# Patient Record
Sex: FEMALE | Race: WHITE | NOT HISPANIC OR LATINO | Employment: UNEMPLOYED | URBAN - METROPOLITAN AREA
[De-identification: names, ages, dates, MRNs, and addresses within clinical notes are randomized per-mention and may not be internally consistent; named-entity substitution may affect disease eponyms.]

---

## 2018-07-26 ENCOUNTER — TELEPHONE (OUTPATIENT)
Dept: OBGYN CLINIC | Facility: CLINIC | Age: 28
End: 2018-07-26

## 2018-07-26 ENCOUNTER — OFFICE VISIT (OUTPATIENT)
Dept: OBGYN CLINIC | Facility: MEDICAL CENTER | Age: 28
End: 2018-07-26
Payer: COMMERCIAL

## 2018-07-26 VITALS
BODY MASS INDEX: 30.37 KG/M2 | WEIGHT: 189 LBS | SYSTOLIC BLOOD PRESSURE: 102 MMHG | DIASTOLIC BLOOD PRESSURE: 70 MMHG | HEIGHT: 66 IN

## 2018-07-26 DIAGNOSIS — Z34.91 PRENATAL CARE IN FIRST TRIMESTER: Primary | ICD-10-CM

## 2018-07-26 DIAGNOSIS — N91.2 AMENORRHEA: Primary | ICD-10-CM

## 2018-07-26 PROBLEM — Z87.59 STATUS POST VACUUM-ASSISTED VAGINAL DELIVERY: Status: ACTIVE | Noted: 2017-07-09

## 2018-07-26 PROBLEM — E03.9 HYPOTHYROIDISM AFFECTING PREGNANCY: Status: ACTIVE | Noted: 2017-07-08

## 2018-07-26 PROBLEM — E03.9 HYPOTHYROIDISM AFFECTING PREGNANCY: Status: RESOLVED | Noted: 2017-07-08 | Resolved: 2018-07-26

## 2018-07-26 PROBLEM — E89.0 HYPOTHYROIDISM, POSTSURGICAL: Status: ACTIVE | Noted: 2018-07-26

## 2018-07-26 PROBLEM — O99.280 HYPOTHYROIDISM AFFECTING PREGNANCY: Status: RESOLVED | Noted: 2017-07-08 | Resolved: 2018-07-26

## 2018-07-26 PROBLEM — O99.280 HYPOTHYROIDISM AFFECTING PREGNANCY: Status: ACTIVE | Noted: 2017-07-08

## 2018-07-26 PROBLEM — Z87.59 STATUS POST VACUUM-ASSISTED VAGINAL DELIVERY: Status: RESOLVED | Noted: 2017-07-09 | Resolved: 2018-07-26

## 2018-07-26 PROCEDURE — 99202 OFFICE O/P NEW SF 15 MIN: CPT | Performed by: NURSE PRACTITIONER

## 2018-07-26 PROCEDURE — 76817 TRANSVAGINAL US OBSTETRIC: CPT | Performed by: NURSE PRACTITIONER

## 2018-07-26 RX ORDER — LEVOTHYROXINE SODIUM 175 UG/1
TABLET ORAL
Refills: 3 | COMMUNITY
Start: 2018-07-06 | End: 2018-08-28

## 2018-07-26 RX ORDER — LEVOTHYROXINE SODIUM 150 UG/1
CAPSULE ORAL
COMMUNITY
Start: 2017-02-14 | End: 2018-08-28

## 2018-07-26 NOTE — PROGRESS NOTES
EARLY PREGNANCY ULTRASOUND    SUBJECTIVE    HPI: Kezia Go is a 33 y o   new patient female here today for early pregnancy ultrasound  Accompanied by FOB  No LMP recorded (lmp unknown)  Patient is pregnant  Menses are irregular and come every 1 5 months or so  This pregnancy was unplanned but not prevented  PMHx is significant for thyroid cancer with resulting thyroidectomy and now on replacement therapy - TSH levels are managed by PCP, she cannot recall name of this provider at this time  OBHx is significant for vacuum-assisted vaginal delivery 2017 at a hospital in [place]  Denies a family history of birth defects or intellectual defects  Did have varicella as a child  Not Taking a prenatal vitamin  Works as a SAHM  Allergies   Allergen Reactions    Aspirin Anaphylaxis and Angioedema       Current Outpatient Prescriptions:     levothyroxine 75 mcg tablet, TK 1 T PO D, Disp: , Rfl:     levothyroxine 175 mcg tablet, , Disp: , Rfl: 3      OBJECTIVE  Vitals:    18 1111   BP: 102/70   BP Location: Left arm   Patient Position: Sitting   Weight: 85 7 kg (189 lb)   Height: 5' 5 5" (1 664 m)         Early OB Ultrasound Procedure Note: Transvaginal US    Technician: Study performed by the interpreting NP    Indications:  Early gestation, dating & viability    Procedure Details: Limited ultrasound examination  The entire study was done at settings of 6 0 to 8 0 MHz  Findings:  A gestational sac is Present  A yolk sac is Present  The crown-rump length measures 0 75 centimeters and calculates to an estimated gestational age of 7 weeks, 5 days  Embryonic cardiac activity is seen at a rate of 143 b/min  The cul-de-sac has no fluid  The uterus is anteverted in position  There is a corpus luteum on the left ovary   The right ovary appears normal  The cervix appears normal       ASSESSMENT  Viable, aviles intrauterine pregnancy  6 weeks 5 days with a calculated ELIEZER of 3/16/2019 based on CRL  Hypothyroidism      PLAN  1 - Discussed referral to M to review genetic screening options for fetus  2 - Return to office for OB interview and PN-1 visit  3 - Begin taking a prenatal vitamin  4 - Plan to draw TSH levels with routine prenatal labs  All questions were answered & Niraj Gaona expressed understanding      Salas Apo

## 2018-07-26 NOTE — Clinical Note
Please find out name of patient's pharmacy so that I can call in a prenatal vitamin prescription for her  Thanks!

## 2018-07-26 NOTE — TELEPHONE ENCOUNTER
----- Message from [facility] sent at 7/26/2018  5:19 PM EDT -----  Please find out name of patient's pharmacy so that I can call in a prenatal vitamin prescription for her  Thanks!

## 2018-07-27 RX ORDER — PNV NO.95/FERROUS FUM/FOLIC AC 28MG-0.8MG
1 TABLET ORAL DAILY
Qty: 90 TABLET | Refills: 3 | Status: SHIPPED | OUTPATIENT
Start: 2018-07-27 | End: 2021-05-12 | Stop reason: ALTCHOICE

## 2018-08-09 ENCOUNTER — INITIAL PRENATAL (OUTPATIENT)
Dept: OBGYN CLINIC | Facility: CLINIC | Age: 28
End: 2018-08-09

## 2018-08-09 VITALS
WEIGHT: 192.6 LBS | SYSTOLIC BLOOD PRESSURE: 108 MMHG | BODY MASS INDEX: 30.95 KG/M2 | HEIGHT: 66 IN | DIASTOLIC BLOOD PRESSURE: 66 MMHG

## 2018-08-09 DIAGNOSIS — Z34.81 PRENATAL CARE, SUBSEQUENT PREGNANCY, FIRST TRIMESTER: Primary | ICD-10-CM

## 2018-08-09 PROCEDURE — OBC: Performed by: OBSTETRICS & GYNECOLOGY

## 2018-08-09 NOTE — PROGRESS NOTES
Pn Int completed  Pt & FOB oriented to office/outpt labs  Pn bldwk ordered in epic, including TSH/FT4  UnPlanned pregnancy, but are happy  Consults for seq screen & level 2  20 wks u s  Entered in [facility]  Pn 1 visit scheduled  Pt & FOB to consider CF testing-informed NPO status 1 hr prior

## 2018-08-15 ENCOUNTER — APPOINTMENT (OUTPATIENT)
Dept: LAB | Facility: CLINIC | Age: 28
End: 2018-08-15
Payer: COMMERCIAL

## 2018-08-15 ENCOUNTER — TELEPHONE (OUTPATIENT)
Dept: OBGYN CLINIC | Facility: CLINIC | Age: 28
End: 2018-08-15

## 2018-08-15 ENCOUNTER — TRANSCRIBE ORDERS (OUTPATIENT)
Dept: LAB | Facility: CLINIC | Age: 28
End: 2018-08-15

## 2018-08-15 DIAGNOSIS — Z34.81 PRENATAL CARE, SUBSEQUENT PREGNANCY, FIRST TRIMESTER: Primary | ICD-10-CM

## 2018-08-15 DIAGNOSIS — R79.89 ELEVATED TSH: Primary | ICD-10-CM

## 2018-08-15 LAB
ABO GROUP BLD: NORMAL
BASOPHILS # BLD AUTO: 0.04 THOUSANDS/ΜL (ref 0–0.1)
BASOPHILS NFR BLD AUTO: 0 % (ref 0–1)
BLD GP AB SCN SERPL QL: NEGATIVE
EOSINOPHIL # BLD AUTO: 0.06 THOUSAND/ΜL (ref 0–0.61)
EOSINOPHIL NFR BLD AUTO: 1 % (ref 0–6)
ERYTHROCYTE [DISTWIDTH] IN BLOOD BY AUTOMATED COUNT: 14.7 % (ref 11.6–15.1)
HCT VFR BLD AUTO: 37.2 % (ref 34.8–46.1)
HGB BLD-MCNC: 11.9 G/DL (ref 11.5–15.4)
IMM GRANULOCYTES # BLD AUTO: 0.03 THOUSAND/UL (ref 0–0.2)
IMM GRANULOCYTES NFR BLD AUTO: 0 % (ref 0–2)
LYMPHOCYTES # BLD AUTO: 1.74 THOUSANDS/ΜL (ref 0.6–4.47)
LYMPHOCYTES NFR BLD AUTO: 19 % (ref 14–44)
MCH RBC QN AUTO: 27.1 PG (ref 26.8–34.3)
MCHC RBC AUTO-ENTMCNC: 32 G/DL (ref 31.4–37.4)
MCV RBC AUTO: 85 FL (ref 82–98)
MONOCYTES # BLD AUTO: 0.41 THOUSAND/ΜL (ref 0.17–1.22)
MONOCYTES NFR BLD AUTO: 5 % (ref 4–12)
NEUTROPHILS # BLD AUTO: 6.72 THOUSANDS/ΜL (ref 1.85–7.62)
NEUTS SEG NFR BLD AUTO: 75 % (ref 43–75)
NRBC BLD AUTO-RTO: 0 /100 WBCS
PLATELET # BLD AUTO: 317 THOUSANDS/UL (ref 149–390)
PMV BLD AUTO: 9.9 FL (ref 8.9–12.7)
RBC # BLD AUTO: 4.39 MILLION/UL (ref 3.81–5.12)
RH BLD: POSITIVE
RUBV IGG SERPL IA-ACNC: 69.9 IU/ML
SPECIMEN EXPIRATION DATE: NORMAL
T4 FREE SERPL-MCNC: 0.94 NG/DL (ref 0.76–1.46)
TSH SERPL DL<=0.05 MIU/L-ACNC: 29.1 UIU/ML (ref 0.36–3.74)
WBC # BLD AUTO: 9 THOUSAND/UL (ref 4.31–10.16)

## 2018-08-15 PROCEDURE — 36415 COLL VENOUS BLD VENIPUNCTURE: CPT

## 2018-08-15 PROCEDURE — 84443 ASSAY THYROID STIM HORMONE: CPT

## 2018-08-15 PROCEDURE — 84439 ASSAY OF FREE THYROXINE: CPT

## 2018-08-15 PROCEDURE — 80081 OBSTETRIC PANEL INC HIV TSTG: CPT

## 2018-08-15 NOTE — TELEPHONE ENCOUNTER
----- Message from Sally Good MD sent at 8/15/2018  3:59 PM EDT -----  Can you please call Higinio Jadyn - her TSH is VERY elevated for pregnancy  Can you confirm what dosing she is on, and that she has been compliant with therapy because we are going to need to increase her dose

## 2018-08-15 NOTE — TELEPHONE ENCOUNTER
CARLOS    Spoke with Pt today via phone call  Pt states that she takes 150 mcg and 175 mcg of Levothyroxine daily  Pt further states she alternates taking the two different dosages each day, takes 150 mcg one day, takes 175 mcg the next day per doctor's order  Pt states she has been compliant with taking her Levothyroxine medication daily  Pt informed that her TSH level is very elevated for pregnancy, thyroid medication may need to be increased per doctor's recommendation

## 2018-08-16 ENCOUNTER — APPOINTMENT (OUTPATIENT)
Dept: LAB | Facility: CLINIC | Age: 28
End: 2018-08-16
Payer: COMMERCIAL

## 2018-08-16 LAB
BACTERIA UR QL AUTO: ABNORMAL /HPF
BILIRUB UR QL STRIP: NEGATIVE
CLARITY UR: ABNORMAL
COLOR UR: YELLOW
GLUCOSE UR STRIP-MCNC: NEGATIVE MG/DL
HBV SURFACE AG SER QL: NORMAL
HGB UR QL STRIP.AUTO: NEGATIVE
KETONES UR STRIP-MCNC: NEGATIVE MG/DL
LEUKOCYTE ESTERASE UR QL STRIP: ABNORMAL
NITRITE UR QL STRIP: NEGATIVE
NON-SQ EPI CELLS URNS QL MICRO: ABNORMAL /HPF
PH UR STRIP.AUTO: 7 [PH] (ref 4.5–8)
PROT UR STRIP-MCNC: NEGATIVE MG/DL
RBC #/AREA URNS AUTO: ABNORMAL /HPF
RPR SER QL: NORMAL
SP GR UR STRIP.AUTO: 1.02 (ref 1–1.03)
UROBILINOGEN UR QL STRIP.AUTO: 0.2 E.U./DL
WBC #/AREA URNS AUTO: ABNORMAL /HPF

## 2018-08-16 PROCEDURE — 81001 URINALYSIS AUTO W/SCOPE: CPT

## 2018-08-16 PROCEDURE — 87086 URINE CULTURE/COLONY COUNT: CPT

## 2018-08-18 LAB
BACTERIA UR CULT: NORMAL
HIV 1+2 AB+HIV1 P24 AG SERPL QL IA: NORMAL

## 2018-08-20 NOTE — TELEPHONE ENCOUNTER
Spoke with pt - she stated she spoke with her Endocrine and they advised her to take 200mcg every day and recheck in 4 wks  Lab mailed to pt

## 2018-08-28 ENCOUNTER — ROUTINE PRENATAL (OUTPATIENT)
Dept: OBGYN CLINIC | Age: 28
End: 2018-08-28

## 2018-08-28 VITALS — WEIGHT: 191 LBS | DIASTOLIC BLOOD PRESSURE: 62 MMHG | SYSTOLIC BLOOD PRESSURE: 120 MMHG | BODY MASS INDEX: 30.83 KG/M2

## 2018-08-28 DIAGNOSIS — R79.89 ELEVATED TSH: ICD-10-CM

## 2018-08-28 DIAGNOSIS — Z34.81 ENCOUNTER FOR SUPERVISION OF OTHER NORMAL PREGNANCY IN FIRST TRIMESTER: Primary | ICD-10-CM

## 2018-08-28 PROCEDURE — G0145 SCR C/V CYTO,THINLAYER,RESCR: HCPCS | Performed by: NURSE PRACTITIONER

## 2018-08-28 PROCEDURE — PNV: Performed by: NURSE PRACTITIONER

## 2018-08-28 PROCEDURE — 87591 N.GONORRHOEAE DNA AMP PROB: CPT | Performed by: NURSE PRACTITIONER

## 2018-08-28 PROCEDURE — 87491 CHLMYD TRACH DNA AMP PROBE: CPT | Performed by: NURSE PRACTITIONER

## 2018-08-28 RX ORDER — LEVOTHYROXINE SODIUM 0.2 MG/1
TABLET ORAL
Refills: 1 | COMMUNITY
Start: 2018-08-17 | End: 2019-05-14 | Stop reason: DRUGHIGH

## 2018-08-28 NOTE — PATIENT INSTRUCTIONS
Pregnancy at 11 to 14 Weeks   AMBULATORY CARE:   What changes are happening to your body: You are now at the end of your first trimester and entering your second trimester  Morning sickness usually goes away by this time  You may have other symptoms such as fatigue, frequent urination, and headaches  You may have gained between 2 to 4 pounds by now  Seek care immediately if:   · You have pain or cramping in your abdomen or low back  · You have heavy vaginal bleeding or clotting  · You pass material that looks like tissue or large clots  Collect the material and bring it with you  Contact your healthcare provider if:   · You cannot keep food or drinks down, and you are losing weight  · You have light bleeding  · You have chills or a fever  · You have vaginal itching, burning, or pain  · You have yellow, green, white, or foul-smelling vaginal discharge  · You have pain or burning when you urinate, less urine than usual, or pink or bloody urine  · You have questions or concerns about your condition or care  How to care for yourself at this stage of your pregnancy:   · Get plenty of rest   You may feel more tired than normal  You may need to take naps or go to bed earlier  · Manage nausea and vomiting  Avoid fatty and spicy foods  Eat small meals throughout the day instead of large meals  Ines may help to decrease nausea  Ask your healthcare provider about other ways of decreasing nausea and vomiting  · Eat a variety of healthy foods  Healthy foods include fruits, vegetables, whole-grain breads, low-fat dairy foods, beans, lean meats, and fish  Drink liquids as directed  Ask how much liquid to drink each day and which liquids are best for you  Limit caffeine to less than 200 milligrams each day  Limit your intake of fish to 2 servings each week  Choose fish low in mercury such as canned light tuna, shrimp, salmon, cod, or tilapia   Do not  eat fish high in mercury such as swordfish, tilefish, clayton mackerel, and shark  · Take prenatal vitamins as directed  Your need for certain vitamins and minerals, such as folic acid, increases during pregnancy  Prenatal vitamins provide some of the extra vitamins and minerals you need  Prenatal vitamins may also help to decrease the risk of certain birth defects  · Do not smoke  If you smoke, it is never too late to quit  Smoking increases your risk of a miscarriage and other health problems during your pregnancy  Smoking can cause your baby to be born too early or weigh less at birth  Ask your healthcare provider for information if you need help quitting  · Do not drink alcohol  Alcohol passes from your body to your baby through the placenta  It can affect your baby's brain development and cause fetal alcohol syndrome (FAS)  FAS is a group of conditions that causes mental, behavior, and growth problems  · Talk to your healthcare provider before you take any medicines  Many medicines may harm your baby if you take them when you are pregnant  Do not take any medicines, vitamins, herbs, or supplements without first talking to your healthcare provider  Never use illegal or street drugs (such as marijuana or cocaine) while you are pregnant  Safety tips during pregnancy:   · Avoid hot tubs and saunas  Do not use a hot tub or sauna while you are pregnant, especially during your first trimester  Hot tubs and saunas may raise your baby's temperature and increase the risk of birth defects  · Avoid toxoplasmosis  This is an infection caused by eating raw meat or being around infected cat feces  It can cause birth defects, miscarriages, and other problems  Wash your hands after you touch raw meat  Make sure any meat is well-cooked before you eat it  Avoid raw eggs and unpasteurized milk  Use gloves or ask someone else to clean your cat's litter box while you are pregnant  Changes that are happening with your baby:   Your baby has fully formed fingernails and toenails  Your baby's heartbeat can now be heard  Ask your healthcare provider if you can listen to your baby's heartbeat  By week 14, your baby is over 4 inches long from the top of the head to the rump (baby's bottom)  Your baby weighs over 3 ounces  What you need to know about prenatal care:  During the first 28 weeks of your pregnancy, you will see your healthcare provider once a month  Prenatal care can help prevent problems during pregnancy and childbirth  Your healthcare provider will check your blood pressure and weight  You may also need any of the following:  · A urine test  may also be done to check for sugar and protein  These can be signs of gestational diabetes or infection  · Genetic disorders screening tests  may be offered to you  This screening test checks your baby's risk of genetic disorders such as Down syndrome  The screening test includes a blood test and ultrasound  · Your baby's heart rate  will be checked  © 2017 [employer] Information is for End User's use only and may not be sold, redistributed or otherwise used for commercial purposes  All illustrations and images included in CareNotes® are the copyrighted property of [employer]  or Martín Francis  The above information is an  only  It is not intended as medical advice for individual conditions or treatments  Talk to your doctor, nurse or pharmacist before following any medical regimen to see if it is safe and effective for you

## 2018-08-28 NOTE — PROGRESS NOTES
Problem List Items Addressed This Visit     None      Visit Diagnoses     Encounter for supervision of other normal pregnancy in first trimester    -  Primary    Elevated TSH            Here for 1st OB appt with  and 3 yo son  Feels well  Pap/GC/CT done today  Changed her mind about CF testing and declined  Perinatology appt on 9/11  Denies LOF/CTX/VB  No concerns  Currently on levothyroxine 200mcg-seeing Endocrinology in 2 weeks

## 2018-08-29 LAB
CHLAMYDIA DNA CVX QL NAA+PROBE: NORMAL
N GONORRHOEA DNA GENITAL QL NAA+PROBE: NORMAL

## 2018-08-31 LAB
LAB AP GYN PRIMARY INTERPRETATION: NORMAL
Lab: NORMAL

## 2018-09-11 ENCOUNTER — ROUTINE PRENATAL (OUTPATIENT)
Dept: PERINATAL CARE | Facility: MEDICAL CENTER | Age: 28
End: 2018-09-11
Payer: COMMERCIAL

## 2018-09-11 VITALS
DIASTOLIC BLOOD PRESSURE: 69 MMHG | WEIGHT: 195 LBS | SYSTOLIC BLOOD PRESSURE: 113 MMHG | HEART RATE: 73 BPM | HEIGHT: 66 IN | BODY MASS INDEX: 31.34 KG/M2

## 2018-09-11 DIAGNOSIS — Z3A.13 13 WEEKS GESTATION OF PREGNANCY: Primary | ICD-10-CM

## 2018-09-11 DIAGNOSIS — Z36.82 ENCOUNTER FOR ANTENATAL SCREENING FOR NUCHAL TRANSLUCENCY: ICD-10-CM

## 2018-09-11 DIAGNOSIS — E07.9 THYROID DISEASE DURING PREGNANCY IN FIRST TRIMESTER: ICD-10-CM

## 2018-09-11 DIAGNOSIS — O99.281 THYROID DISEASE DURING PREGNANCY IN FIRST TRIMESTER: ICD-10-CM

## 2018-09-11 PROCEDURE — 76801 OB US < 14 WKS SINGLE FETUS: CPT | Performed by: OBSTETRICS & GYNECOLOGY

## 2018-09-11 PROCEDURE — 76813 OB US NUCHAL MEAS 1 GEST: CPT | Performed by: OBSTETRICS & GYNECOLOGY

## 2018-09-11 PROCEDURE — 99201 PR OFFICE OUTPATIENT NEW 10 MINUTES: CPT | Performed by: OBSTETRICS & GYNECOLOGY

## 2018-09-11 NOTE — PROGRESS NOTES
[facility] [place]: Ms Dario Palomino was seen today at 13w3d for nuchal translucency ultrasound  See ultrasound report under "OB Procedures" tab  Please don't hesitate to contact our office with any concerns or questions    Jimmy Montgomery MD

## 2018-09-11 NOTE — PATIENT INSTRUCTIONS
Thank you for choosing [facility] for your  care today  If you have any questions about your ultrasound or care, please do not hesitate to contact us or your primary obstetrician

## 2018-09-14 ENCOUNTER — TELEPHONE (OUTPATIENT)
Dept: PERINATAL CARE | Facility: CLINIC | Age: 28
End: 2018-09-14

## 2018-09-27 ENCOUNTER — ROUTINE PRENATAL (OUTPATIENT)
Dept: OBGYN CLINIC | Facility: MEDICAL CENTER | Age: 28
End: 2018-09-27

## 2018-09-27 VITALS
WEIGHT: 196.6 LBS | TEMPERATURE: 98.2 F | HEART RATE: 66 BPM | HEIGHT: 66 IN | DIASTOLIC BLOOD PRESSURE: 68 MMHG | SYSTOLIC BLOOD PRESSURE: 126 MMHG | RESPIRATION RATE: 14 BRPM | BODY MASS INDEX: 31.6 KG/M2

## 2018-09-27 DIAGNOSIS — Z34.92 ENCOUNTER FOR SUPERVISION OF NORMAL PREGNANCY IN SECOND TRIMESTER, UNSPECIFIED GRAVIDITY: Primary | ICD-10-CM

## 2018-09-27 DIAGNOSIS — J06.9 VIRAL UPPER RESPIRATORY TRACT INFECTION: ICD-10-CM

## 2018-09-27 DIAGNOSIS — O99.282 THYROID DISEASE DURING PREGNANCY IN SECOND TRIMESTER: ICD-10-CM

## 2018-09-27 DIAGNOSIS — E07.9 THYROID DISEASE DURING PREGNANCY IN SECOND TRIMESTER: ICD-10-CM

## 2018-09-27 DIAGNOSIS — Z3A.15 15 WEEKS GESTATION OF PREGNANCY: ICD-10-CM

## 2018-09-27 PROCEDURE — PNV: Performed by: NURSE PRACTITIONER

## 2018-09-27 NOTE — PROGRESS NOTES
Problem List Items Addressed This Visit     15 weeks gestation of pregnancy    Thyroid disease during pregnancy in second trimester     Synthroid has been adjusted and Rachel Knight reports she has orders to recheck TSH on 10/16  She is asymptomatic          Encounter for supervision of normal pregnancy in second trimester - Primary     Denies OB complaints  + fetal movement  Denies contractions, cramping, leakage of fluid or vaginal bleeding  Flu vaccine deferred today given current URI sx - she agrees to this at next visit  Encouraged to schedule L2  Reviewed reasons to call  Viral upper respiratory tract infection     Currently with mild UTI sx  + sick contact - baby at home has same sx  Afebrile  Reviewed meds safe in preg and reasons to call PCP

## 2018-09-27 NOTE — ASSESSMENT & PLAN NOTE
Synthroid has been adjusted and Paola Mccauley reports she has orders to recheck TSH on 10/16   She is asymptomatic

## 2018-09-27 NOTE — ASSESSMENT & PLAN NOTE
Currently with mild UTI sx  + sick contact - baby at home has same sx  Afebrile  Reviewed meds safe in preg and reasons to call PCP

## 2018-09-27 NOTE — ASSESSMENT & PLAN NOTE
Denies OB complaints  + fetal movement  Denies contractions, cramping, leakage of fluid or vaginal bleeding  Flu vaccine deferred today given current URI sx - she agrees to this at next visit  Encouraged to schedule L2  Reviewed reasons to call

## 2018-10-10 ENCOUNTER — TRANSCRIBE ORDERS (OUTPATIENT)
Dept: ADMINISTRATIVE | Facility: HOSPITAL | Age: 28
End: 2018-10-10

## 2018-10-10 ENCOUNTER — APPOINTMENT (OUTPATIENT)
Dept: LAB | Facility: MEDICAL CENTER | Age: 28
End: 2018-10-10
Payer: COMMERCIAL

## 2018-10-10 DIAGNOSIS — Z33.1 PREGNANT STATE, INCIDENTAL: Primary | ICD-10-CM

## 2018-10-10 DIAGNOSIS — Z36.9 RESEARCH REQUESTED ANTENATAL ULTRASOUND SCAN: ICD-10-CM

## 2018-10-10 PROCEDURE — 36415 COLL VENOUS BLD VENIPUNCTURE: CPT | Performed by: OBSTETRICS & GYNECOLOGY

## 2018-10-11 LAB — SCAN RESULT: NORMAL

## 2018-10-16 ENCOUNTER — TELEPHONE (OUTPATIENT)
Dept: PERINATAL CARE | Facility: CLINIC | Age: 28
End: 2018-10-16

## 2018-10-25 ENCOUNTER — ROUTINE PRENATAL (OUTPATIENT)
Dept: OBGYN CLINIC | Facility: MEDICAL CENTER | Age: 28
End: 2018-10-25
Payer: COMMERCIAL

## 2018-10-25 VITALS — WEIGHT: 200 LBS | BODY MASS INDEX: 32.28 KG/M2 | SYSTOLIC BLOOD PRESSURE: 120 MMHG | DIASTOLIC BLOOD PRESSURE: 74 MMHG

## 2018-10-25 DIAGNOSIS — Z3A.19 19 WEEKS GESTATION OF PREGNANCY: ICD-10-CM

## 2018-10-25 DIAGNOSIS — Z34.92 ENCOUNTER FOR SUPERVISION OF NORMAL PREGNANCY IN SECOND TRIMESTER, UNSPECIFIED GRAVIDITY: Primary | ICD-10-CM

## 2018-10-25 DIAGNOSIS — O99.282 THYROID DISEASE DURING PREGNANCY IN SECOND TRIMESTER: ICD-10-CM

## 2018-10-25 DIAGNOSIS — O26.892 PELVIC PAIN AFFECTING PREGNANCY IN SECOND TRIMESTER, ANTEPARTUM: ICD-10-CM

## 2018-10-25 DIAGNOSIS — E07.9 THYROID DISEASE DURING PREGNANCY IN SECOND TRIMESTER: ICD-10-CM

## 2018-10-25 DIAGNOSIS — R10.2 PELVIC PAIN AFFECTING PREGNANCY IN SECOND TRIMESTER, ANTEPARTUM: ICD-10-CM

## 2018-10-25 DIAGNOSIS — Z23 FLU VACCINE NEED: ICD-10-CM

## 2018-10-25 PROBLEM — J06.9 VIRAL UPPER RESPIRATORY TRACT INFECTION: Status: RESOLVED | Noted: 2018-09-27 | Resolved: 2018-10-25

## 2018-10-25 PROCEDURE — PNV: Performed by: NURSE PRACTITIONER

## 2018-10-25 PROCEDURE — 90686 IIV4 VACC NO PRSV 0.5 ML IM: CPT | Performed by: OBSTETRICS & GYNECOLOGY

## 2018-10-25 PROCEDURE — 90471 IMMUNIZATION ADMIN: CPT | Performed by: OBSTETRICS & GYNECOLOGY

## 2018-10-25 RX ORDER — LEVOTHYROXINE SODIUM 0.05 MG/1
TABLET ORAL
Refills: 3 | COMMUNITY
Start: 2018-09-21 | End: 2019-05-14 | Stop reason: DRUGHIGH

## 2018-10-25 NOTE — PROGRESS NOTES
Problem List Items Addressed This Visit     19 weeks gestation of pregnancy    Thyroid disease during pregnancy in second trimester     Repeat TSH since synthroid dose adjustment was WNL per pt  She is managed by [facility] Endocr[facility]ology and reports labs were drawn out of network, thus not available for review  She is scheduled to repeat TSH in 1 and 2 months per Endo  Relevant Medications    levothyroxine 50 mcg tablet    Encounter for supervision of normal pregnancy in second trimester - Primary     Denies OB complaints  Good fetal movement  Denies contractions, cramping, leakage of fluid or vaginal bleeding  L2 is scheduled  Flu vaccine was administered today  Reviewed PTL precautions and reasons to call  Pelvic pain affecting pregnancy in second trimester, antepartum     C/o symphisis pain with certain activities incl position change, prolonged standing  Reviewed comfort measures and advised pelvic PT consult  Referral provided            Relevant Orders    Ambulatory referral to Physical Therapy      Other Visit Diagnoses     Flu vaccine need        Relevant Orders    SYRINGE/SINGLE-DOSE VIAL: influenza vaccine, [id number], quadrivalent, 0 5 mL, preservative-free, for patients 3+ yr ([facility])

## 2018-10-25 NOTE — ASSESSMENT & PLAN NOTE
Repeat TSH since synthroid dose adjustment was WNL per pt  She is managed by [facility] Endocrinology and reports labs were drawn out of network, thus not available for review  She is scheduled to repeat TSH in 1 and 2 months per Endo

## 2018-10-25 NOTE — ASSESSMENT & PLAN NOTE
Denies OB complaints  Good fetal movement  Denies contractions, cramping, leakage of fluid or vaginal bleeding  L2 is scheduled  Flu vaccine was administered today  Reviewed PTL precautions and reasons to call

## 2018-10-25 NOTE — ASSESSMENT & PLAN NOTE
C/o symphisis pain with certain activities incl position change, prolonged standing  Reviewed comfort measures and advised pelvic PT consult  Referral provided

## 2018-11-01 ENCOUNTER — ROUTINE PRENATAL (OUTPATIENT)
Dept: PERINATAL CARE | Facility: MEDICAL CENTER | Age: 28
End: 2018-11-01
Payer: COMMERCIAL

## 2018-11-01 VITALS
BODY MASS INDEX: 32.02 KG/M2 | WEIGHT: 199.2 LBS | DIASTOLIC BLOOD PRESSURE: 57 MMHG | HEART RATE: 89 BPM | SYSTOLIC BLOOD PRESSURE: 109 MMHG | HEIGHT: 66 IN

## 2018-11-01 DIAGNOSIS — O99.282 THYROID DISEASE DURING PREGNANCY IN SECOND TRIMESTER: ICD-10-CM

## 2018-11-01 DIAGNOSIS — Z3A.20 20 WEEKS GESTATION OF PREGNANCY: ICD-10-CM

## 2018-11-01 DIAGNOSIS — E07.9 THYROID DISEASE DURING PREGNANCY IN SECOND TRIMESTER: ICD-10-CM

## 2018-11-01 DIAGNOSIS — O99.210 OBESITY AFFECTING PREGNANCY, ANTEPARTUM: Primary | ICD-10-CM

## 2018-11-01 DIAGNOSIS — Z36.86 ENCOUNTER FOR ANTENATAL SCREENING FOR CERVICAL LENGTH: ICD-10-CM

## 2018-11-01 PROCEDURE — 99212 OFFICE O/P EST SF 10 MIN: CPT | Performed by: OBSTETRICS & GYNECOLOGY

## 2018-11-01 PROCEDURE — 76817 TRANSVAGINAL US OBSTETRIC: CPT | Performed by: OBSTETRICS & GYNECOLOGY

## 2018-11-01 PROCEDURE — 76811 OB US DETAILED SNGL FETUS: CPT | Performed by: OBSTETRICS & GYNECOLOGY

## 2018-11-05 PROBLEM — Z3A.20 20 WEEKS GESTATION OF PREGNANCY: Status: ACTIVE | Noted: 2018-09-11

## 2018-11-05 NOTE — PROGRESS NOTES
The patient was seen today for an ultrasound  Please see ultrasound report (located under imaging) for additional details  Thank you very much for allowing us to participate in the care of this very nice patient  Should you have any questions, please do not hesitate to contact our office

## 2018-11-19 ENCOUNTER — ROUTINE PRENATAL (OUTPATIENT)
Dept: OBGYN CLINIC | Facility: MEDICAL CENTER | Age: 28
End: 2018-11-19

## 2018-11-19 VITALS — DIASTOLIC BLOOD PRESSURE: 78 MMHG | WEIGHT: 211.8 LBS | SYSTOLIC BLOOD PRESSURE: 134 MMHG | BODY MASS INDEX: 34.19 KG/M2

## 2018-11-19 DIAGNOSIS — Z34.92 ENCOUNTER FOR SUPERVISION OF NORMAL PREGNANCY IN SECOND TRIMESTER, UNSPECIFIED GRAVIDITY: ICD-10-CM

## 2018-11-19 DIAGNOSIS — Z34.81 ENCOUNTER FOR SUPERVISION OF OTHER NORMAL PREGNANCY IN FIRST TRIMESTER: Primary | ICD-10-CM

## 2018-11-19 PROCEDURE — PNV: Performed by: PHYSICIAN ASSISTANT

## 2018-11-19 NOTE — PROGRESS NOTES
Patient w/o complaints  (+) good fetal movement, denies any bleeding, fluid leakage or ctx  Level 2 WNL, rec 32wk growth US due to hypothyroidism and missed cardiac views      Problem List Items Addressed This Visit     Encounter for supervision of normal pregnancy in second trimester     RTO 4 wks  28wk lab slip given  Reviewed reasons to call           Other Visit Diagnoses     Encounter for supervision of other normal pregnancy in first trimester    -  Primary    Relevant Orders    RPR    CBC and differential    Glucose, 1H PG

## 2018-12-19 ENCOUNTER — ROUTINE PRENATAL (OUTPATIENT)
Dept: OBGYN CLINIC | Facility: CLINIC | Age: 28
End: 2018-12-19
Payer: COMMERCIAL

## 2018-12-19 VITALS — BODY MASS INDEX: 33.8 KG/M2 | WEIGHT: 209.4 LBS | DIASTOLIC BLOOD PRESSURE: 78 MMHG | SYSTOLIC BLOOD PRESSURE: 122 MMHG

## 2018-12-19 DIAGNOSIS — E89.0 POSTOPERATIVE HYPOTHYROIDISM: ICD-10-CM

## 2018-12-19 DIAGNOSIS — Z23 NEED FOR TDAP VACCINATION: Primary | ICD-10-CM

## 2018-12-19 DIAGNOSIS — Z3A.27 27 WEEKS GESTATION OF PREGNANCY: ICD-10-CM

## 2018-12-19 DIAGNOSIS — Z34.93 THIRD TRIMESTER PREGNANCY: ICD-10-CM

## 2018-12-19 PROCEDURE — 90715 TDAP VACCINE 7 YRS/> IM: CPT | Performed by: OBSTETRICS & GYNECOLOGY

## 2018-12-19 PROCEDURE — PNV: Performed by: OBSTETRICS & GYNECOLOGY

## 2018-12-19 PROCEDURE — 90471 IMMUNIZATION ADMIN: CPT | Performed by: OBSTETRICS & GYNECOLOGY

## 2018-12-19 NOTE — ASSESSMENT & PLAN NOTE
Patient feels well with the exception of some fatigue  She also had a recent GI bug and is recovering from vomiting and diarrhea  She has lost 3-4 lb since her last visit  She feels that she is now improving  She has been using sports drinks and bland diet  She denies fever  Twenty-eight week laboratory studies were advised we also added a TSH  Patient is interested in switching to a [facility] endocrinology provider and that contact information was provided to the patient today  Patient received Tdap vaccination today

## 2018-12-19 NOTE — PROGRESS NOTES
Problem List Items Addressed This Visit        Other    27 weeks gestation of pregnancy     Patient feels well with the exception of some fatigue  She also had a recent GI bug and is recovering from vomiting and diarrhea  She has lost 3-4 lb since her last visit  She feels that she is now improving  She has been using sports drinks and bland diet  She denies fever  Twenty-eight week laboratory studies were advised we also added a TSH  Patient is interested in switching to a [facility] endocrinology provider and that contact information was provided to the patient today  Patient received Tdap vaccination today             Other Visit Diagnoses     Need for Tdap vaccination    -  Primary    Relevant Orders    TDAP VACCINE GREATER THAN OR EQUAL TO 8YO IM (Completed)    Third trimester pregnancy        Relevant Orders    TDAP VACCINE GREATER THAN OR EQUAL TO 8YO IM (Completed)    Postoperative hypothyroidism        Relevant Orders    TSH Stimulation    Ambulatory referral to Endocrinology

## 2018-12-19 NOTE — PROGRESS NOTES
Breast pump order given , baby and me book given, she has her 28 week lab slip  Received flu shot already, Tdap given today

## 2018-12-28 ENCOUNTER — TRANSCRIBE ORDERS (OUTPATIENT)
Dept: ADMINISTRATIVE | Facility: HOSPITAL | Age: 28
End: 2018-12-28

## 2018-12-28 ENCOUNTER — APPOINTMENT (OUTPATIENT)
Dept: LAB | Facility: MEDICAL CENTER | Age: 28
End: 2018-12-28
Payer: COMMERCIAL

## 2018-12-28 DIAGNOSIS — Z34.81 ENCOUNTER FOR SUPERVISION OF OTHER NORMAL PREGNANCY IN FIRST TRIMESTER: Primary | ICD-10-CM

## 2018-12-28 DIAGNOSIS — E89.0 POSTOPERATIVE HYPOTHYROIDISM: ICD-10-CM

## 2018-12-28 DIAGNOSIS — Z34.81 ENCOUNTER FOR SUPERVISION OF OTHER NORMAL PREGNANCY IN FIRST TRIMESTER: ICD-10-CM

## 2018-12-28 LAB
BASOPHILS # BLD AUTO: 0.03 THOUSANDS/ΜL (ref 0–0.1)
BASOPHILS NFR BLD AUTO: 0 % (ref 0–1)
EOSINOPHIL # BLD AUTO: 0.05 THOUSAND/ΜL (ref 0–0.61)
EOSINOPHIL NFR BLD AUTO: 1 % (ref 0–6)
ERYTHROCYTE [DISTWIDTH] IN BLOOD BY AUTOMATED COUNT: 13.2 % (ref 11.6–15.1)
GLUCOSE 1H P 50 G GLC PO SERPL-MCNC: 94 MG/DL
HCT VFR BLD AUTO: 33 % (ref 34.8–46.1)
HGB BLD-MCNC: 10.2 G/DL (ref 11.5–15.4)
IMM GRANULOCYTES # BLD AUTO: 0.05 THOUSAND/UL (ref 0–0.2)
IMM GRANULOCYTES NFR BLD AUTO: 1 % (ref 0–2)
LYMPHOCYTES # BLD AUTO: 1.53 THOUSANDS/ΜL (ref 0.6–4.47)
LYMPHOCYTES NFR BLD AUTO: 15 % (ref 14–44)
MCH RBC QN AUTO: 26.4 PG (ref 26.8–34.3)
MCHC RBC AUTO-ENTMCNC: 30.9 G/DL (ref 31.4–37.4)
MCV RBC AUTO: 86 FL (ref 82–98)
MONOCYTES # BLD AUTO: 0.53 THOUSAND/ΜL (ref 0.17–1.22)
MONOCYTES NFR BLD AUTO: 5 % (ref 4–12)
NEUTROPHILS # BLD AUTO: 7.91 THOUSANDS/ΜL (ref 1.85–7.62)
NEUTS SEG NFR BLD AUTO: 78 % (ref 43–75)
NRBC BLD AUTO-RTO: 0 /100 WBCS
PLATELET # BLD AUTO: 287 THOUSANDS/UL (ref 149–390)
PMV BLD AUTO: 9.5 FL (ref 8.9–12.7)
RBC # BLD AUTO: 3.86 MILLION/UL (ref 3.81–5.12)
TSH 30M P TRH SERPL-ACNC: 0.8 UIU/ML
TSH SERPL DL<=0.05 MIU/L-ACNC: 0.78 UIU/ML
WBC # BLD AUTO: 10.1 THOUSAND/UL (ref 4.31–10.16)

## 2018-12-28 PROCEDURE — 86592 SYPHILIS TEST NON-TREP QUAL: CPT

## 2018-12-28 PROCEDURE — 85025 COMPLETE CBC W/AUTO DIFF WBC: CPT

## 2018-12-28 PROCEDURE — 36415 COLL VENOUS BLD VENIPUNCTURE: CPT

## 2018-12-28 PROCEDURE — 84443 ASSAY THYROID STIM HORMONE: CPT

## 2018-12-28 PROCEDURE — 82950 GLUCOSE TEST: CPT

## 2018-12-30 LAB — RPR SER QL: NORMAL

## 2018-12-31 ENCOUNTER — TELEPHONE (OUTPATIENT)
Dept: OBGYN CLINIC | Facility: CLINIC | Age: 28
End: 2018-12-31

## 2018-12-31 NOTE — TELEPHONE ENCOUNTER
----- Message from Marsha Gee PA-C sent at 12/28/2018  3:46 PM EST -----  Please let Abner Meza know that her 1hr gtt is normal  Her CBC shows mild anemia - please add Slow Fe one po daily  Her other results are still pending  Thanks!

## 2019-01-17 ENCOUNTER — ROUTINE PRENATAL (OUTPATIENT)
Dept: OBGYN CLINIC | Facility: MEDICAL CENTER | Age: 29
End: 2019-01-17

## 2019-01-17 VITALS — BODY MASS INDEX: 34.86 KG/M2 | DIASTOLIC BLOOD PRESSURE: 66 MMHG | WEIGHT: 216 LBS | SYSTOLIC BLOOD PRESSURE: 120 MMHG

## 2019-01-17 DIAGNOSIS — E07.9 THYROID DISEASE DURING PREGNANCY IN SECOND TRIMESTER: ICD-10-CM

## 2019-01-17 DIAGNOSIS — Z3A.27 27 WEEKS GESTATION OF PREGNANCY: ICD-10-CM

## 2019-01-17 DIAGNOSIS — O99.282 THYROID DISEASE DURING PREGNANCY IN SECOND TRIMESTER: ICD-10-CM

## 2019-01-17 PROBLEM — Z34.92 ENCOUNTER FOR SUPERVISION OF NORMAL PREGNANCY IN SECOND TRIMESTER: Status: RESOLVED | Noted: 2018-09-27 | Resolved: 2019-01-17

## 2019-01-17 PROBLEM — Z34.90 SUPERVISION OF NORMAL PREGNANCY: Status: ACTIVE | Noted: 2019-01-17

## 2019-01-17 PROCEDURE — PNV: Performed by: PHYSICIAN ASSISTANT

## 2019-01-17 RX ORDER — FERROUS SULFATE 325(65) MG
325 TABLET ORAL
COMMUNITY
End: 2019-06-13 | Stop reason: ALTCHOICE

## 2019-01-17 NOTE — PROGRESS NOTES
Hypothyroidism, postsurgical  Recent TSH WNL  Cont current Levothyroxine regimen  F/u growth scan sched 1/24 w/ [facility]      Supervision of normal pregnancy  Pt feels well  Good FM   No ctx, LOF, VB  Has had tdap/flu vacc  Taking Fe supplement for mild anemia on 28 wk labs  Reviewed s/sx PTL, reasons to call

## 2019-01-17 NOTE — ASSESSMENT & PLAN NOTE
Pt feels well  Good FM   No ctx, LOF, VB  Has had tdap/flu vacc  Taking Fe supplement for mild anemia on 28 wk labs  Reviewed s/sx PTL, reasons to call

## 2019-01-24 ENCOUNTER — ULTRASOUND (OUTPATIENT)
Dept: PERINATAL CARE | Facility: MEDICAL CENTER | Age: 29
End: 2019-01-24
Payer: COMMERCIAL

## 2019-01-24 VITALS
HEIGHT: 66 IN | HEART RATE: 97 BPM | DIASTOLIC BLOOD PRESSURE: 82 MMHG | SYSTOLIC BLOOD PRESSURE: 120 MMHG | BODY MASS INDEX: 35.2 KG/M2 | WEIGHT: 219 LBS

## 2019-01-24 DIAGNOSIS — Z3A.32 32 WEEKS GESTATION OF PREGNANCY: ICD-10-CM

## 2019-01-24 DIAGNOSIS — E07.9 THYROID DISEASE DURING PREGNANCY IN THIRD TRIMESTER: ICD-10-CM

## 2019-01-24 DIAGNOSIS — O99.283 THYROID DISEASE DURING PREGNANCY IN THIRD TRIMESTER: ICD-10-CM

## 2019-01-24 DIAGNOSIS — IMO0002 EVALUATE ANATOMY NOT SEEN ON PRIOR SONOGRAM: Primary | ICD-10-CM

## 2019-01-24 PROCEDURE — 76816 OB US FOLLOW-UP PER FETUS: CPT | Performed by: OBSTETRICS & GYNECOLOGY

## 2019-01-24 PROCEDURE — 99212 OFFICE O/P EST SF 10 MIN: CPT | Performed by: OBSTETRICS & GYNECOLOGY

## 2019-01-24 NOTE — PROGRESS NOTES
[address] [facility] [place]: Ms Jalaine Kayser was seen today at 32w5d for fetal growth and followup missed anatomy ultrasound  See ultrasound report under "OB Procedures" tab  Please don't hesitate to contact our office with any concerns or questions    Sp Espinoza MD

## 2019-01-24 NOTE — PATIENT INSTRUCTIONS
Thank you for choosing [facility] for your  care today  If you have any questions about your ultrasound or care, please do not hesitate to contact us or your primary obstetrician  At this time, no additional ultrasounds are advised through the  center, however, if your doctors would like you to have any additional ultrasounds, they will let us know  Thank you for getting your influenza vaccine  Please aim for vaccination of your entire household and insist that anyone coming into contact with your baby be vaccinated

## 2019-01-31 ENCOUNTER — TELEPHONE (OUTPATIENT)
Dept: OBGYN CLINIC | Facility: MEDICAL CENTER | Age: 29
End: 2019-01-31

## 2019-01-31 NOTE — TELEPHONE ENCOUNTER
Patient did not come to her PN appointment today  Called patient and left message to reschedule her appointment

## 2019-02-01 ENCOUNTER — ROUTINE PRENATAL (OUTPATIENT)
Dept: OBGYN CLINIC | Facility: MEDICAL CENTER | Age: 29
End: 2019-02-01

## 2019-02-01 VITALS
BODY MASS INDEX: 35.35 KG/M2 | RESPIRATION RATE: 14 BRPM | DIASTOLIC BLOOD PRESSURE: 66 MMHG | SYSTOLIC BLOOD PRESSURE: 122 MMHG | HEIGHT: 66 IN

## 2019-02-01 DIAGNOSIS — Z34.83 ENCOUNTER FOR SUPERVISION OF OTHER NORMAL PREGNANCY IN THIRD TRIMESTER: Primary | ICD-10-CM

## 2019-02-01 PROCEDURE — PNV: Performed by: PHYSICIAN ASSISTANT

## 2019-02-01 NOTE — PROGRESS NOTES
Thyroid disease during pregnancy in third trimester  F/u growth scan w/ MFM WNL  No further imaging scheduled  Cont current Levothyroxine regimen    Supervision of normal pregnancy  Feels well  Good FM   No routine ctx, LOF, VB  Has had Tdap/flu vacc  Reviewed s/sx PTL, reasons to call

## 2019-02-01 NOTE — ASSESSMENT & PLAN NOTE
Feels well  Good FM   No routine ctx, LOF, VB  Has had Tdap/flu vacc  Reviewed s/sx PTL, reasons to call

## 2019-02-14 ENCOUNTER — ROUTINE PRENATAL (OUTPATIENT)
Dept: OBGYN CLINIC | Facility: MEDICAL CENTER | Age: 29
End: 2019-02-14

## 2019-02-14 VITALS — WEIGHT: 222.8 LBS | SYSTOLIC BLOOD PRESSURE: 100 MMHG | BODY MASS INDEX: 35.96 KG/M2 | DIASTOLIC BLOOD PRESSURE: 60 MMHG

## 2019-02-14 DIAGNOSIS — Z34.83 ENCOUNTER FOR SUPERVISION OF OTHER NORMAL PREGNANCY IN THIRD TRIMESTER: Primary | ICD-10-CM

## 2019-02-14 DIAGNOSIS — Z34.93 ENCOUNTER FOR PREGNANCY RELATED EXAMINATION IN THIRD TRIMESTER: ICD-10-CM

## 2019-02-14 PROCEDURE — 87653 STREP B DNA AMP PROBE: CPT | Performed by: NURSE PRACTITIONER

## 2019-02-14 PROCEDURE — PNV: Performed by: NURSE PRACTITIONER

## 2019-02-14 NOTE — ASSESSMENT & PLAN NOTE
Denies OB complaints  Good fetal movement  Denies contractions, cramping, leakage of fluid or vaginal bleeding  GBS was collected today  S/p flu and tdap vaccines  Baby and Me considerations reinforced  Reviewed labor precautions and FKCs

## 2019-02-14 NOTE — PROGRESS NOTES
Problem List Items Addressed This Visit        Other    Encounter for supervision of normal pregnancy in third trimester - Primary     Denies OB complaints  Good fetal movement  Denies contractions, cramping, leakage of fluid or vaginal bleeding  GBS was collected today  S/p flu and tdap vaccines  Baby and Me considerations reinforced  Reviewed labor precautions and FKCs              Other Visit Diagnoses     Encounter for pregnancy related examination in third trimester        Relevant Orders    Strep B DNA probe, amplification

## 2019-02-16 LAB — GP B STREP DNA SPEC QL NAA+PROBE: NORMAL

## 2019-02-20 ENCOUNTER — HOSPITAL ENCOUNTER (EMERGENCY)
Facility: HOSPITAL | Age: 29
Discharge: LEFT BEFORE DISCHARGE | End: 2019-02-20
Attending: EMERGENCY MEDICINE
Payer: COMMERCIAL

## 2019-02-20 ENCOUNTER — TELEPHONE (OUTPATIENT)
Dept: LABOR AND DELIVERY | Facility: HOSPITAL | Age: 29
End: 2019-02-20

## 2019-02-20 ENCOUNTER — HOSPITAL ENCOUNTER (OUTPATIENT)
Facility: HOSPITAL | Age: 29
Discharge: HOME | End: 2019-02-20
Attending: OBSTETRICS & GYNECOLOGY | Admitting: OBSTETRICS & GYNECOLOGY
Payer: COMMERCIAL

## 2019-02-20 VITALS
SYSTOLIC BLOOD PRESSURE: 149 MMHG | BODY MASS INDEX: 37.27 KG/M2 | HEART RATE: 73 BPM | RESPIRATION RATE: 17 BRPM | TEMPERATURE: 97.9 F | OXYGEN SATURATION: 98 % | WEIGHT: 231.92 LBS | HEIGHT: 66 IN | DIASTOLIC BLOOD PRESSURE: 82 MMHG

## 2019-02-20 VITALS
RESPIRATION RATE: 20 BRPM | OXYGEN SATURATION: 97 % | TEMPERATURE: 98 F | SYSTOLIC BLOOD PRESSURE: 126 MMHG | HEART RATE: 74 BPM | DIASTOLIC BLOOD PRESSURE: 67 MMHG
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
## 2019-02-20 PROCEDURE — 96360 HYDRATION IV INFUSION INIT: CPT

## 2019-02-20 PROCEDURE — 99213 OFFICE O/P EST LOW 20 MIN: CPT | Performed by: OBSTETRICS & GYNECOLOGY

## 2019-02-20 PROCEDURE — 99283 EMERGENCY DEPT VISIT LOW MDM: CPT

## 2019-02-20 PROCEDURE — 99204 OFFICE O/P NEW MOD 45 MIN: CPT

## 2019-02-20 RX ADMIN — SODIUM CHLORIDE, SODIUM LACTATE, POTASSIUM CHLORIDE, AND CALCIUM CHLORIDE 1000 ML: .6; .31; .03; .02 INJECTION, SOLUTION INTRAVENOUS at 17:26

## 2019-02-20 NOTE — ED PROVIDER NOTES
History  Chief Complaint   Patient presents with    Fall     Pt reports slipping on ice and fell on gravel  C/o left hand and lower back pain  Pt is currently 37 weeks pregnant  34 y/o female here for evaluation of low back pain after fall  Occurred about 1 hour ago  Slipped on ice/snow falling onto buttocks and low back  Diffuse low back pain  Worse with movement and palpation  Braced fall with left hand and suffered superficial abrasion on this hand  Minimal pain  Tetanus UTD  Currently 37 weeks pregnant  No vaginal bleeding or vaginal discharge  No pelvic pain or abdominal pain  Denies chest pain  Still feels baby moving  Follows with [address] and was told to go to [facility] [place] but did not want to drive all the way down there in this snow  History provided by:  Patient   used: No    Fall   Mechanism of injury: fall    Injury location: lower back, left hand  Incident location:  Outdoors  Time since incident:  1 hour  Arrived directly from scene: yes    Fall:     Fall occurred:  Tripped    Height of fall:  Standing    Impact surface:  Ice and snow    Point of impact:  Buttocks    Entrapped after fall: no    Protective equipment: none    Suspicion of alcohol use: no    Suspicion of drug use: no    Tetanus status:  Up to date  Prior to arrival data:     Bystander interventions:  None  Associated symptoms: back pain    Associated symptoms: no abdominal pain, no blindness, no chest pain, no difficulty breathing, no headaches, no hearing loss, no loss of consciousness, no nausea, no neck pain, no seizures and no vomiting    Risk factors: pregnant    Risk factors: no anticoagulation therapy        Prior to Admission Medications   Prescriptions Last Dose Informant Patient Reported? Taking?    Prenatal Vit-Fe Fumarate-FA (PRENATAL VITAMIN) 27-0 8 MG TABS  Self No No   Sig: Take 1 tablet by mouth daily   ferrous sulfate 325 (65 Fe) mg tablet  Self Yes No   Sig: Take 325 mg by mouth daily with breakfast   levothyroxine 200 mcg tablet  Self Yes No   Sig: TK 1 T PO  QD   levothyroxine 50 mcg tablet  Self Yes No   Sig: TK 1 T PO  QD      Facility-Administered Medications: None       Past Medical History:   Diagnosis Date    Anemia     hx of    Hypothyroidism affecting pregnancy 7/8/2017    Ovarian cyst     age 13    Status post vacuum-assisted vaginal delivery 7/9/2017    Thyroid cancer ([address] )     thyroid CA- throidectomy 9/2017    Urinary tract infection     hx of uti's    Varicella     childhood chickenpox       Past Surgical History:   Procedure Laterality Date    THYROIDECTOMY         Family History   Problem Relation Age of Onset    Cancer Mother     Stroke Father     Heart disease Father         defect    Cancer Paternal Grandfather         throat     I have reviewed and agree with the history as documented  Social History     Tobacco Use    Smoking status: Former Smoker     Last attempt to quit: 1/9/2016     Years since quitting: 3 1    Smokeless tobacco: Never Used    Tobacco comment: quit 2016   Substance Use Topics    Alcohol use: No    Drug use: Not Currently     Types: Marijuana     Comment: last  used 10 yrs ago  Review of Systems   Constitutional: Negative for activity change, appetite change, chills, diaphoresis, fatigue, fever and unexpected weight change  HENT: Negative for congestion, hearing loss, rhinorrhea, sinus pressure, sore throat and trouble swallowing  Eyes: Negative for blindness, photophobia and visual disturbance  Respiratory: Negative for apnea, cough, choking, chest tightness, shortness of breath, wheezing and stridor  Cardiovascular: Negative for chest pain, palpitations and leg swelling  Gastrointestinal: Negative for abdominal distention, abdominal pain, blood in stool, constipation, diarrhea, nausea and vomiting     Genitourinary: Negative for decreased urine volume, difficulty urinating, dysuria, enuresis, flank pain, frequency, hematuria and urgency  Musculoskeletal: Positive for back pain  Negative for arthralgias, myalgias, neck pain and neck stiffness  Skin: Negative for color change, pallor, rash and wound  Allergic/Immunologic: Negative  Neurological: Negative for dizziness, tremors, seizures, loss of consciousness, syncope, weakness, light-headedness, numbness and headaches  Hematological: Negative  Psychiatric/Behavioral: Negative  All other systems reviewed and are negative  Physical Exam  Physical Exam   Constitutional: She is oriented to person, place, and time  She appears well-developed and well-nourished  Non-toxic appearance  She does not have a sickly appearance  She does not appear ill  No distress  HENT:   Head: Normocephalic and atraumatic  Eyes: Pupils are equal, round, and reactive to light  EOM and lids are normal    Neck: Normal range of motion  Neck supple  Cardiovascular: Normal rate, regular rhythm, S1 normal, S2 normal, normal heart sounds, intact distal pulses and normal pulses  Exam reveals no gallop, no distant heart sounds, no friction rub and no decreased pulses  No murmur heard  Pulses:       Radial pulses are 2+ on the right side, and 2+ on the left side  Pulmonary/Chest: Effort normal and breath sounds normal  No accessory muscle usage  No apnea, no tachypnea and no bradypnea  No respiratory distress  She has no decreased breath sounds  She has no wheezes  She has no rhonchi  She has no rales  Abdominal: Soft  Normal appearance  She exhibits no distension  There is no tenderness  There is no rigidity, no rebound and no guarding  Musculoskeletal: Normal range of motion  She exhibits no edema or deformity  Thoracic back: Normal         Lumbar back: She exhibits tenderness, bony tenderness and pain  She exhibits normal range of motion, no swelling, no edema, no deformity, no laceration and no spasm          Back:         Left hand: She exhibits laceration (abrasion)  She exhibits normal range of motion, no tenderness, no bony tenderness, normal capillary refill, no deformity and no swelling  Normal sensation noted  Normal strength noted  Hands:  Neurological: She is alert and oriented to person, place, and time  No cranial nerve deficit  GCS eye subscore is 4  GCS verbal subscore is 5  GCS motor subscore is 6  GCS 15  AAOx3  Ambulating in department without difficulty  CN II-XII grossly intact  No focal neuro deficits  Skin: Skin is warm, dry and intact  No rash noted  She is not diaphoretic  No erythema  No pallor  Psychiatric: Her speech is normal    Nursing note and vitals reviewed  Vital Signs  ED Triage Vitals [02/20/19 1422]   Temperature Pulse Respirations Blood Pressure SpO2   97 9 °F (36 6 °C) 73 17 149/82 98 %      Temp Source Heart Rate Source Patient Position - Orthostatic VS BP Location FiO2 (%)   Oral Monitor Sitting Right arm --      Pain Score       3           Vitals:    02/20/19 1422   BP: 149/82   Pulse: 73   Patient Position - Orthostatic VS: Sitting       Visual Acuity      ED Medications  Medications - No data to display    Diagnostic Studies  Results Reviewed     None                 No orders to display              Procedures  Procedures       Phone Contacts  ED Phone Contact    ED Course                               MDM  Number of Diagnoses or Management Options  Fall: new and requires workup  Diagnosis management comments: DDx including but not limited to: intracranial injury, concussion, cervical injury, intrathoracic injury, intraabdominal injury, extremity injury  Risk of Complications, Morbidity, and/or Mortality  Presenting problems: moderate  Management options: low  General comments: Plan/MDM: 34 y/o s/p fall  37 weeks pregnant  Superficial abrasion left hand, no tenderness; no further workup for this at this time   In regards to her low back pain, given that she is 37 weeks pregnant she should have period of observation with toco  She has normal fetal heart tones here in ED, documented by nursing staff  Offered to transfer patient to [facility] for observation where she follows with OB  Pt declined  Pt does not want to go to [facility] in this weather  She would like to go to [facility]  She understands that if there is a complication related to her pregnancy it would be [facility] treating her and not her OB that she has been following with  She would still like to go to [facility]  I offered transport by ambulance  Pt declined  Pt would like to sign AMA and drive directly there by private transportation  I did call ahead to L&D at [facility] to make them aware of pt arrival  Return parameters provided  Pt understands and agrees with plan  Pt ambulated out of department       Patient Progress  Patient progress: stable      Disposition  Final diagnoses:   Fall     Time reflects when diagnosis was documented in both MDM as applicable and the Disposition within this note     Time User Action Codes Description Comment    2/20/2019  2:32 PM Kristine Park Add [Q53  XXXA] Fall       ED Disposition     ED Disposition Condition Date/Time Comment    AMCASSANDRA  Wed Feb 20, 2019  2:34 PM Date: 2/20/2019  Patient: Precious Combs  Admitted: 2/20/2019  2:20 PM  Attending Provider: Brenna Lewis MD    Precious Combs or her authorized caregiver has made the decision for the patient to leave the emergency department against the advice  of [facility]  She or her authorized caregiver has been informed and understands the inherent risks, including death, pain, pre-term labor, prom  She or her authorized caregiver has decided to accept the responsibility for this decision  Precious Combs and all necessary parties have been advised that she may return for further evaluation or treatment  Her condition at time of discharge was stabl    Precious Combs had current vital signs as follows:  /82 (BP Location: Right arm )   Pulse 73   Temp 97 9 °F (36 6 °C) (Oral)   Resp 17   Ht 5' 6" (1 676 m)   Wt 105 kg (231 lb 14 8 oz)   LMP  (LMP Unknown)         Follow-up Information     [facility]             Discharge Medication List as of 2/20/2019  2:34 PM      CONTINUE these medications which have NOT CHANGED    Details   ferrous sulfate 325 (65 Fe) mg tablet Take 325 mg by mouth daily with breakfast, Historical Med      !! levothyroxine 200 mcg tablet TK 1 T PO  QD, Historical Med      !! levothyroxine 50 mcg tablet TK 1 T PO  QD, Historical Med      Prenatal Vit-Fe Fumarate-FA (PRENATAL VITAMIN) 27-0 8 MG TABS Take 1 tablet by mouth daily, Starting Fri 7/27/2018, Normal       !! - Potential duplicate medications found  Please discuss with provider  No discharge procedures on file      ED Provider  Electronically Signed by           Paloma Ascencio PA-C  02/20/19 4168

## 2019-02-20 NOTE — DISCHARGE INSTRUCTIONS
Pregnancy at [address] to [address]:   You are considered full term at the beginning of 37 weeks  Your breathing may be easier if your baby has moved down into a head-down position  You may need to urinate more often because the baby may be pressing on your bladder  You may also feel more discomfort and get tired easily  DISCHARGE INSTRUCTIONS:   Seek care immediately if:   · You develop a severe headache that does not go away  · You have new or increased vision changes, such as blurred or spotted vision  · You have new or increased swelling in your face or hands  · You have vaginal spotting or bleeding  · Your water broke or you feel warm water gushing or trickling from your vagina  Contact your healthcare provider if:   · You have more than 5 contractions in 1 hour  · You notice any changes in your baby's movements  · You have abdominal cramps, pressure, or tightening  · You have a change in vaginal discharge  · You have chills or a fever  · You have vaginal itching, burning, or pain  · You have yellow, green, white, or foul-smelling vaginal discharge  · You have pain or burning when you urinate, less urine than usual, or pink or bloody urine  · You have questions or concerns about your condition or care  How to care for yourself at this stage of your pregnancy:   · Eat a variety of healthy foods  Healthy foods include fruits, vegetables, whole-grain breads, low-fat dairy foods, beans, lean meats, and fish  Drink liquids as directed  Ask how much liquid to drink each day and which liquids are best for you  Limit caffeine to less than 200 milligrams each day  Limit your intake of fish to 2 servings each week  Choose fish low in mercury such as canned light tuna, shrimp, salmon, cod, or tilapia  Do not  eat fish high in mercury such as swordfish, tilefish, clayton mackerel, and shark  · Take prenatal vitamins as directed    Your need for certain vitamins and minerals, such as folic acid, increases during pregnancy  Prenatal vitamins provide some of the extra vitamins and minerals you need  Prenatal vitamins may also help to decrease the risk of certain birth defects  · Rest as needed  Put your feet up if you have swelling in your ankles and feet  · Do not smoke  If you smoke, it is never too late to quit  Smoking increases your risk of a miscarriage and other health problems during your pregnancy  Smoking can cause your baby to be born too early or weigh less at birth  Ask your healthcare provider for information if you need help quitting  · Do not drink alcohol  Alcohol passes from your body to your baby through the placenta  It can affect your baby's brain development and cause fetal alcohol syndrome (FAS)  FAS is a group of conditions that causes mental, behavior, and growth problems  · Talk to your healthcare provider before you take any medicines  Many medicines may harm your baby if you take them when you are pregnant  Do not take any medicines, vitamins, herbs, or supplements without first talking to your healthcare provider  Never use illegal or street drugs (such as marijuana or cocaine) while you are pregnant  · Talk to your healthcare provider before you travel  You may not be able to travel in an airplane after 36 weeks  He may also recommend that you avoid long road trips  Safety tips:   · Avoid hot tubs and saunas  Do not use a hot tub or sauna while you are pregnant, especially during your first trimester  Hot tubs and saunas may raise your baby's temperature and increase the risk of birth defects  · Avoid toxoplasmosis  This is an infection caused by eating raw meat or being around infected cat feces  It can cause birth defects, miscarriages, and other problems  Wash your hands after you touch raw meat  Make sure any meat is well-cooked before you eat it  Avoid raw eggs and unpasteurized milk   Use gloves or ask someone else to clean your cat's litter box while you are pregnant  · Ask your healthcare provider about travel  The most comfortable time to travel is during the second trimester  Ask your healthcare provider if you can travel after 36 weeks  You may not be able to travel in an airplane after 36 weeks  He may also recommend that you avoid long road trips  Changes that are happening with your baby:  By 38 weeks, your baby may weigh between 6 and 9 pounds  Your baby may be about 14 inches long from the top of the head to the rump (baby's bottom)  Your baby hears well enough to know your voice  As your baby gets larger, you may feel fewer kicks and more stretching and rolling  Your baby may move into a head-down position  Your baby will also rest lower in your abdomen  What you need to know about prenatal care: Your healthcare provider will check your blood pressure and weight  You may also need the following:  · A urine test  may also be done to check for sugar and protein  These can be signs of gestational diabetes or infection  Protein in your urine may also be a sign of preeclampsia  Preeclampsia is a condition that can develop during week 20 or later of your pregnancy  It causes high blood pressure, and it can cause problems with your kidneys and other organs  · A blood test  may be done to check for anemia (low iron level)  · A Tdap vaccine  may be recommended by your healthcare provider  · A group B strep test  is a test that is done to check for group B strep infection  Group B strep is a type of bacteria that may be found in the vagina or rectum  It can be passed to your baby during delivery if you have it  Your healthcare provider will take swab your vagina or rectum and send the sample to the lab for tests  · Fundal height  is a measurement of your uterus to check your baby's growth  This number is usually the same as the number of weeks that you have been pregnant   Your healthcare provider may also check your baby's position  · Your baby's heart rate  will be checked  © 2017 [address] Information is for End User's use only and may not be sold, redistributed or otherwise used for commercial purposes  All illustrations and images included in CareNotes® are the copyrighted property of A D A M [employer]  or Martín Francis  The above information is an  only  It is not intended as medical advice for individual conditions or treatments  Talk to your doctor, nurse or pharmacist before following any medical regimen to see if it is safe and effective for you

## 2019-02-20 NOTE — TELEPHONE ENCOUNTER
Pt called because she fell on the ice about an hour ago  She landed on her hand and buttocks  She is feeling good FM and no vaginal bleeding but advised pt to come to L&D to be monitored

## 2019-02-20 NOTE — PROGRESS NOTES
L&D Triage Note - OB/GYN  Charley Horton 33 y o  female MRN: [id number]  Unit/Bed#:  Triage  Encounter: [id number]      Assessment:  33 y o   at 36w4d s/p fall    Plan:  1  IV fluids for cramping  2  Continued fetal monitoring until 1800    Dr Marianne Gonzalez aware     Tim Barnhart, DO        ______________________________________________________________________      Chief Compliant: s/p fall    TIME: 1740  Subjective:  33 y o   at 36w4d presents for evaluation following fall at home  Reports slipping on ice while leaving her home around noon today  Reports falling on her backside and elbow, denying direct abdominal trauma  Denies loss of consciousness, passing out, hitting head  Denies being pushed to ground  Reports mild abdominal cramping since fall  Denies vaginal bleeding, leakage of fluid  Endorses adequate fetal movement  Pregnancy notable for hypothyroidism levothyroxine         Objective:  Vitals:    19 1625   BP: 126/67   Pulse: 74   Resp: 20   Temp: 98 °F (36 7 °C)   SpO2:        SVE: 0 5 / 50% / -3  FHT:  120 / Moderate 6 - 25 bpm / + accelerations, no decelerations  Eleanor: q5-6 minutes           Tim Barnhart DO 2019 5:40 PM

## 2019-02-26 ENCOUNTER — ROUTINE PRENATAL (OUTPATIENT)
Dept: OBGYN CLINIC | Facility: MEDICAL CENTER | Age: 29
End: 2019-02-26

## 2019-02-26 VITALS — DIASTOLIC BLOOD PRESSURE: 78 MMHG | WEIGHT: 229.8 LBS | BODY MASS INDEX: 37.09 KG/M2 | SYSTOLIC BLOOD PRESSURE: 124 MMHG

## 2019-02-26 DIAGNOSIS — O99.283 THYROID DISEASE DURING PREGNANCY IN THIRD TRIMESTER: ICD-10-CM

## 2019-02-26 DIAGNOSIS — Z34.83 ENCOUNTER FOR SUPERVISION OF OTHER NORMAL PREGNANCY IN THIRD TRIMESTER: Primary | ICD-10-CM

## 2019-02-26 DIAGNOSIS — E07.9 THYROID DISEASE DURING PREGNANCY IN THIRD TRIMESTER: ICD-10-CM
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
## 2019-02-26 PROCEDURE — PNV: Performed by: PHYSICIAN ASSISTANT

## 2019-02-26 NOTE — PROGRESS NOTES
Patient is doing well; no complaints  GFM  She received breast pump slip and perineal massage paper already

## 2019-02-26 NOTE — PROGRESS NOTES
Patient having occasional ctx  (+) good fetal movement, denies any bleeding, fluid leakage or ctx    Problem List Items Addressed This Visit        Endocrine    Thyroid disease during pregnancy in third trimester       Other    Encounter for supervision of normal pregnancy in third trimester - Primary     RTO 1 wk  Reviewed labor precautions, fetal kick counts and reasons to call

## 2019-03-07 ENCOUNTER — ULTRASOUND (OUTPATIENT)
Dept: PERINATAL CARE | Facility: MEDICAL CENTER | Age: 29
End: 2019-03-07
Payer: COMMERCIAL

## 2019-03-07 ENCOUNTER — ROUTINE PRENATAL (OUTPATIENT)
Dept: OBGYN CLINIC | Facility: MEDICAL CENTER | Age: 29
End: 2019-03-07

## 2019-03-07 VITALS
SYSTOLIC BLOOD PRESSURE: 134 MMHG | HEART RATE: 83 BPM | WEIGHT: 232.4 LBS | DIASTOLIC BLOOD PRESSURE: 85 MMHG | HEIGHT: 66 IN | BODY MASS INDEX: 37.35 KG/M2

## 2019-03-07 VITALS — SYSTOLIC BLOOD PRESSURE: 124 MMHG | DIASTOLIC BLOOD PRESSURE: 68 MMHG | WEIGHT: 232 LBS | BODY MASS INDEX: 37.45 KG/M2

## 2019-03-07 DIAGNOSIS — O26.843 UTERINE SIZE DATE DISCREPANCY PREGNANCY, THIRD TRIMESTER: Primary | ICD-10-CM

## 2019-03-07 DIAGNOSIS — O26.843 UTERINE SIZE DATE DISCREPANCY PREGNANCY, THIRD TRIMESTER: ICD-10-CM

## 2019-03-07 DIAGNOSIS — Z34.83 ENCOUNTER FOR SUPERVISION OF OTHER NORMAL PREGNANCY IN THIRD TRIMESTER: Primary | ICD-10-CM

## 2019-03-07 DIAGNOSIS — Z36.89 ENCOUNTER FOR ULTRASOUND TO CHECK FETAL GROWTH: ICD-10-CM

## 2019-03-07 DIAGNOSIS — Z3A.38 38 WEEKS GESTATION OF PREGNANCY: ICD-10-CM

## 2019-03-07 PROCEDURE — PNV: Performed by: NURSE PRACTITIONER

## 2019-03-07 PROCEDURE — 76816 OB US FOLLOW-UP PER FETUS: CPT | Performed by: OBSTETRICS & GYNECOLOGY

## 2019-03-07 PROCEDURE — 99212 OFFICE O/P EST SF 10 MIN: CPT | Performed by: OBSTETRICS & GYNECOLOGY

## 2019-03-07 NOTE — PROGRESS NOTES
[facility] Last: Ms Eboni Dumont was seen today at 38w5d for fetal growth assessment ultrasound  See ultrasound report under "OB Procedures" tab  Please don't hesitate to contact our office with any concerns or questions    Angella Torrez MD

## 2019-03-07 NOTE — ASSESSMENT & PLAN NOTE
C/o generalized pelvic discomfort  Otherwise denies OB complaints  Good fetal movement  Denies contractions, cramping, leakage of fluid or vaginal bleeding  Interested in [address]  Reviewed risks/benefits  Recommended EFW/KYLE given S>D today - will advise as indicated with results of US  GBS neg  Reviewed labor precautions and FKCs

## 2019-03-07 NOTE — ASSESSMENT & PLAN NOTE
Size > dates  Baby #1 with birthweight >9lbs  Growth and KYLE recommended  Referral placed and the patient agrees to call to schedule

## 2019-03-07 NOTE — PATIENT INSTRUCTIONS
Thank you for choosing [facility] for your  care today  If you have any questions about your ultrasound or care, please do not hesitate to contact us or your primary obstetrician

## 2019-03-07 NOTE — PROGRESS NOTES
Problem List Items Addressed This Visit        Other    Encounter for supervision of normal pregnancy in third trimester - Primary     C/o generalized pelvic discomfort  Otherwise denies OB complaints  Good fetal movement  Denies contractions, cramping, leakage of fluid or vaginal bleeding  Interested in [address]  Reviewed risks/benefits  Recommended EFW/KYLE given S>D today - will advise as indicated with results of US  GBS neg  Reviewed labor precautions and FKCs  Uterine size date discrepancy pregnancy, third trimester     Size > dates  Baby #1 with birthweight >9lbs  Growth and KYLE recommended  Referral placed and the patient agrees to call to schedule           Relevant Orders    Ambulatory Referral to Maternal Fetal Medicine

## 2019-03-08 ENCOUNTER — TELEPHONE (OUTPATIENT)
Dept: OBGYN CLINIC | Facility: CLINIC | Age: 29
End: 2019-03-08

## 2019-03-11 ENCOUNTER — HOSPITAL ENCOUNTER (OUTPATIENT)
Dept: LABOR AND DELIVERY | Facility: HOSPITAL | Age: 29
Discharge: HOME | DRG: 951 | End: 2019-03-11
Payer: COMMERCIAL

## 2019-03-11 ENCOUNTER — HOSPITAL ENCOUNTER (INPATIENT)
Facility: HOSPITAL | Age: 29
LOS: 1 days | Discharge: HOME | DRG: 951 | End: 2019-03-11
Attending: OBSTETRICS & GYNECOLOGY | Admitting: OBSTETRICS & GYNECOLOGY
Payer: COMMERCIAL

## 2019-03-11 ENCOUNTER — ROUTINE PRENATAL (OUTPATIENT)
Dept: OBGYN CLINIC | Facility: MEDICAL CENTER | Age: 29
End: 2019-03-11

## 2019-03-11 VITALS
RESPIRATION RATE: 18 BRPM | HEART RATE: 109 BPM | DIASTOLIC BLOOD PRESSURE: 70 MMHG | SYSTOLIC BLOOD PRESSURE: 133 MMHG | TEMPERATURE: 97.7 F

## 2019-03-11 DIAGNOSIS — Z3A.39 39 WEEKS GESTATION OF PREGNANCY: Primary | ICD-10-CM

## 2019-03-11 DIAGNOSIS — E89.0 HYPOTHYROIDISM, POSTSURGICAL: ICD-10-CM

## 2019-03-11 DIAGNOSIS — O26.843 UTERINE SIZE DATE DISCREPANCY PREGNANCY, THIRD TRIMESTER: ICD-10-CM

## 2019-03-11 DIAGNOSIS — Z34.83 ENCOUNTER FOR SUPERVISION OF OTHER NORMAL PREGNANCY IN THIRD TRIMESTER: Primary | ICD-10-CM

## 2019-03-11 PROCEDURE — 99213 OFFICE O/P EST LOW 20 MIN: CPT

## 2019-03-11 PROCEDURE — PNV: Performed by: PHYSICIAN ASSISTANT

## 2019-03-11 RX ORDER — SODIUM CHLORIDE, SODIUM LACTATE, POTASSIUM CHLORIDE, CALCIUM CHLORIDE 600; 310; 30; 20 MG/100ML; MG/100ML; MG/100ML; MG/100ML
125 INJECTION, SOLUTION INTRAVENOUS CONTINUOUS
Status: DISCONTINUED | OUTPATIENT
Start: 2019-03-11 | End: 2019-03-11

## 2019-03-11 NOTE — ASSESSMENT & PLAN NOTE
Maintained on levothyroxine 250mcg daily  TSH on 8/15/18 was elevated  In December 2018 a TSH stimulation test was erroneously ordered and returned normal, but no TSH was ordered  Will need to check TSH on admission

## 2019-03-11 NOTE — ASSESSMENT & PLAN NOTE
Had growth scan last week, >95th %ile  Says this baby feels much larger than last pregnancy  Last delivery at 41 weeks at 9 2 lbs , needed vacuum  Discussed elective IOL, very interested  Set up for tonight 8pm Cytotec

## 2019-03-12 ENCOUNTER — ANESTHESIA EVENT (INPATIENT)
Dept: LABOR AND DELIVERY | Facility: HOSPITAL | Age: 29
End: 2019-03-12
Payer: COMMERCIAL

## 2019-03-12 ENCOUNTER — HOSPITAL ENCOUNTER (INPATIENT)
Dept: LABOR AND DELIVERY | Facility: HOSPITAL | Age: 29
Discharge: HOME | End: 2019-03-12
Payer: COMMERCIAL

## 2019-03-12 ENCOUNTER — HOSPITAL ENCOUNTER (INPATIENT)
Facility: HOSPITAL | Age: 29
LOS: 2 days | Discharge: HOME | End: 2019-03-14
Attending: OBSTETRICS & GYNECOLOGY | Admitting: OBSTETRICS & GYNECOLOGY
Payer: COMMERCIAL

## 2019-03-12 ENCOUNTER — ANESTHESIA (INPATIENT)
Dept: LABOR AND DELIVERY | Facility: HOSPITAL | Age: 29
End: 2019-03-12
Payer: COMMERCIAL

## 2019-03-12 DIAGNOSIS — Z3A.39 39 WEEKS GESTATION OF PREGNANCY: Primary | ICD-10-CM

## 2019-03-12 LAB
ABO GROUP BLD: NORMAL
BASE EXCESS BLDCOA CALC-SCNC: -1.3 MMOL/L (ref 3–11)
BASE EXCESS BLDCOV CALC-SCNC: 0.2 MMOL/L (ref 1–9)
BASOPHILS # BLD AUTO: 0.03 THOUSANDS/ΜL (ref 0–0.1)
BASOPHILS NFR BLD AUTO: 0 % (ref 0–1)
BLD GP AB SCN SERPL QL: NEGATIVE
EOSINOPHIL # BLD AUTO: 0.07 THOUSAND/ΜL (ref 0–0.61)
EOSINOPHIL NFR BLD AUTO: 1 % (ref 0–6)
ERYTHROCYTE [DISTWIDTH] IN BLOOD BY AUTOMATED COUNT: 15.4 % (ref 11.6–15.1)
HCO3 BLDCOA-SCNC: 25.4 MMOL/L (ref 17.3–27.3)
HCO3 BLDCOV-SCNC: 25.3 MMOL/L (ref 12.2–28.6)
HCT VFR BLD AUTO: 34.1 % (ref 34.8–46.1)
HGB BLD-MCNC: 10.7 G/DL (ref 11.5–15.4)
IMM GRANULOCYTES # BLD AUTO: 0.04 THOUSAND/UL (ref 0–0.2)
IMM GRANULOCYTES NFR BLD AUTO: 0 % (ref 0–2)
LYMPHOCYTES # BLD AUTO: 1.85 THOUSANDS/ΜL (ref 0.6–4.47)
LYMPHOCYTES NFR BLD AUTO: 20 % (ref 14–44)
MCH RBC QN AUTO: 25.5 PG (ref 26.8–34.3)
MCHC RBC AUTO-ENTMCNC: 31.4 G/DL (ref 31.4–37.4)
MCV RBC AUTO: 81 FL (ref 82–98)
MONOCYTES # BLD AUTO: 0.64 THOUSAND/ΜL (ref 0.17–1.22)
MONOCYTES NFR BLD AUTO: 7 % (ref 4–12)
NEUTROPHILS # BLD AUTO: 6.66 THOUSANDS/ΜL (ref 1.85–7.62)
NEUTS SEG NFR BLD AUTO: 72 % (ref 43–75)
NRBC BLD AUTO-RTO: 0 /100 WBCS
O2 CT VFR BLDCOA CALC: 16.8 ML/DL
OXYHGB MFR BLDCOA: 72.6 %
OXYHGB MFR BLDCOV: 70.9 %
PCO2 BLDCOA: 49.2 MM[HG] (ref 30–60)
PCO2 BLDCOV: 42.5 MM HG (ref 27–43)
PH BLDCOA: 7.33 [PH] (ref 7.23–7.43)
PH BLDCOV: 7.39 [PH] (ref 7.19–7.49)
PLATELET # BLD AUTO: 215 THOUSANDS/UL (ref 149–390)
PMV BLD AUTO: 10.1 FL (ref 8.9–12.7)
PO2 BLDCOA: 32.3 MM HG (ref 5–25)
PO2 BLDCOV: 28.7 MM HG (ref 15–45)
RBC # BLD AUTO: 4.2 MILLION/UL (ref 3.81–5.12)
RH BLD: POSITIVE
RPR SER QL: NORMAL
SAO2 % BLDCOV: 15.8 ML/DL
SPECIMEN EXPIRATION DATE: NORMAL
WBC # BLD AUTO: 9.29 THOUSAND/UL (ref 4.31–10.16)

## 2019-03-12 PROCEDURE — 82805 BLOOD GASES W/O2 SATURATION: CPT | Performed by: OBSTETRICS & GYNECOLOGY

## 2019-03-12 PROCEDURE — 0KQM0ZZ REPAIR PERINEUM MUSCLE, OPEN APPROACH: ICD-10-PCS | Performed by: OBSTETRICS & GYNECOLOGY

## 2019-03-12 PROCEDURE — 86850 RBC ANTIBODY SCREEN: CPT | Performed by: OBSTETRICS & GYNECOLOGY

## 2019-03-12 PROCEDURE — 4A1HXCZ MONITORING OF PRODUCTS OF CONCEPTION, CARDIAC RATE, EXTERNAL APPROACH: ICD-10-PCS | Performed by: OBSTETRICS & GYNECOLOGY

## 2019-03-12 PROCEDURE — 3E033VJ INTRODUCTION OF OTHER HORMONE INTO PERIPHERAL VEIN, PERCUTANEOUS APPROACH: ICD-10-PCS | Performed by: OBSTETRICS & GYNECOLOGY

## 2019-03-12 PROCEDURE — 59400 OBSTETRICAL CARE: CPT | Performed by: OBSTETRICS & GYNECOLOGY

## 2019-03-12 PROCEDURE — 85025 COMPLETE CBC W/AUTO DIFF WBC: CPT | Performed by: OBSTETRICS & GYNECOLOGY

## 2019-03-12 PROCEDURE — 10907ZC DRAINAGE OF AMNIOTIC FLUID, THERAPEUTIC FROM PRODUCTS OF CONCEPTION, VIA NATURAL OR ARTIFICIAL OPENING: ICD-10-PCS | Performed by: OBSTETRICS & GYNECOLOGY

## 2019-03-12 PROCEDURE — 86592 SYPHILIS TEST NON-TREP QUAL: CPT | Performed by: OBSTETRICS & GYNECOLOGY

## 2019-03-12 PROCEDURE — 86901 BLOOD TYPING SEROLOGIC RH(D): CPT | Performed by: OBSTETRICS & GYNECOLOGY

## 2019-03-12 PROCEDURE — 86900 BLOOD TYPING SEROLOGIC ABO: CPT | Performed by: OBSTETRICS & GYNECOLOGY

## 2019-03-12 RX ORDER — METOCLOPRAMIDE HYDROCHLORIDE 5 MG/ML
5 INJECTION INTRAMUSCULAR; INTRAVENOUS EVERY 6 HOURS PRN
Status: DISCONTINUED | OUTPATIENT
Start: 2019-03-12 | End: 2019-03-12

## 2019-03-12 RX ORDER — DOCUSATE SODIUM 100 MG/1
100 CAPSULE, LIQUID FILLED ORAL 2 TIMES DAILY
Status: DISCONTINUED | OUTPATIENT
Start: 2019-03-12 | End: 2019-03-14 | Stop reason: HOSPADM

## 2019-03-12 RX ORDER — OXYCODONE HYDROCHLORIDE AND ACETAMINOPHEN 5; 325 MG/1; MG/1
2 TABLET ORAL EVERY 4 HOURS PRN
Status: DISCONTINUED | OUTPATIENT
Start: 2019-03-12 | End: 2019-03-14 | Stop reason: HOSPADM

## 2019-03-12 RX ORDER — CALCIUM CARBONATE 200(500)MG
1000 TABLET,CHEWABLE ORAL DAILY PRN
Status: DISCONTINUED | OUTPATIENT
Start: 2019-03-12 | End: 2019-03-14 | Stop reason: HOSPADM

## 2019-03-12 RX ORDER — BUPIVACAINE HYDROCHLORIDE 2.5 MG/ML
INJECTION, SOLUTION INFILTRATION; PERINEURAL AS NEEDED
Status: DISCONTINUED | OUTPATIENT
Start: 2019-03-12 | End: 2019-03-12 | Stop reason: SURG

## 2019-03-12 RX ORDER — SODIUM CHLORIDE, SODIUM LACTATE, POTASSIUM CHLORIDE, CALCIUM CHLORIDE 600; 310; 30; 20 MG/100ML; MG/100ML; MG/100ML; MG/100ML
125 INJECTION, SOLUTION INTRAVENOUS CONTINUOUS
Status: DISCONTINUED | OUTPATIENT
Start: 2019-03-12 | End: 2019-03-14 | Stop reason: HOSPADM

## 2019-03-12 RX ORDER — OXYCODONE HYDROCHLORIDE AND ACETAMINOPHEN 5; 325 MG/1; MG/1
1 TABLET ORAL EVERY 4 HOURS PRN
Status: DISCONTINUED | OUTPATIENT
Start: 2019-03-12 | End: 2019-03-14 | Stop reason: HOSPADM

## 2019-03-12 RX ORDER — ACETAMINOPHEN 325 MG/1
650 TABLET ORAL EVERY 6 HOURS PRN
Status: DISCONTINUED | OUTPATIENT
Start: 2019-03-12 | End: 2019-03-14 | Stop reason: HOSPADM

## 2019-03-12 RX ORDER — DIPHENHYDRAMINE HYDROCHLORIDE 50 MG/ML
25 INJECTION INTRAMUSCULAR; INTRAVENOUS EVERY 6 HOURS PRN
Status: DISCONTINUED | OUTPATIENT
Start: 2019-03-12 | End: 2019-03-12

## 2019-03-12 RX ORDER — ONDANSETRON 2 MG/ML
4 INJECTION INTRAMUSCULAR; INTRAVENOUS EVERY 8 HOURS PRN
Status: DISCONTINUED | OUTPATIENT
Start: 2019-03-12 | End: 2019-03-14 | Stop reason: HOSPADM

## 2019-03-12 RX ORDER — OXYTOCIN/RINGER'S LACTATE 30/500 ML
250 PLASTIC BAG, INJECTION (ML) INTRAVENOUS CONTINUOUS
Status: ACTIVE | OUTPATIENT
Start: 2019-03-12 | End: 2019-03-12

## 2019-03-12 RX ORDER — DIPHENHYDRAMINE HYDROCHLORIDE 50 MG/ML
25 INJECTION INTRAMUSCULAR; INTRAVENOUS EVERY 6 HOURS PRN
Status: DISCONTINUED | OUTPATIENT
Start: 2019-03-12 | End: 2019-03-14 | Stop reason: HOSPADM

## 2019-03-12 RX ORDER — OXYTOCIN/RINGER'S LACTATE 30/500 ML
1-30 PLASTIC BAG, INJECTION (ML) INTRAVENOUS
Status: DISCONTINUED | OUTPATIENT
Start: 2019-03-12 | End: 2019-03-12

## 2019-03-12 RX ORDER — ONDANSETRON 2 MG/ML
4 INJECTION INTRAMUSCULAR; INTRAVENOUS EVERY 4 HOURS PRN
Status: DISCONTINUED | OUTPATIENT
Start: 2019-03-12 | End: 2019-03-12

## 2019-03-12 RX ADMIN — ROPIVACAINE HYDROCHLORIDE: 2 INJECTION, SOLUTION EPIDURAL; INFILTRATION at 12:32

## 2019-03-12 RX ADMIN — Medication 2 MILLI-UNITS/MIN: at 08:45

## 2019-03-12 RX ADMIN — OXYCODONE HYDROCHLORIDE AND ACETAMINOPHEN 1 TABLET: 5; 325 TABLET ORAL at 22:23

## 2019-03-12 RX ADMIN — ONDANSETRON 4 MG: 2 INJECTION INTRAMUSCULAR; INTRAVENOUS at 16:39

## 2019-03-12 RX ADMIN — BENZOCAINE AND LEVOMENTHOL: 200; 5 SPRAY TOPICAL at 18:09

## 2019-03-12 RX ADMIN — SODIUM CHLORIDE, SODIUM LACTATE, POTASSIUM CHLORIDE, AND CALCIUM CHLORIDE 125 ML/HR: .6; .31; .03; .02 INJECTION, SOLUTION INTRAVENOUS at 14:29

## 2019-03-12 RX ADMIN — SODIUM CHLORIDE, SODIUM LACTATE, POTASSIUM CHLORIDE, AND CALCIUM CHLORIDE 999 ML/HR: .6; .31; .03; .02 INJECTION, SOLUTION INTRAVENOUS at 12:05

## 2019-03-12 RX ADMIN — SODIUM CHLORIDE, SODIUM LACTATE, POTASSIUM CHLORIDE, AND CALCIUM CHLORIDE 125 ML/HR: .6; .31; .03; .02 INJECTION, SOLUTION INTRAVENOUS at 08:40

## 2019-03-12 RX ADMIN — DOCUSATE SODIUM 100 MG: 100 CAPSULE, LIQUID FILLED ORAL at 18:09

## 2019-03-12 RX ADMIN — WITCH HAZEL 1 PAD: 500 SOLUTION RECTAL; TOPICAL at 18:08

## 2019-03-12 RX ADMIN — BUPIVACAINE HYDROCHLORIDE 1.2 ML: 2.5 INJECTION, SOLUTION INFILTRATION; PERINEURAL at 12:29

## 2019-03-12 RX ADMIN — SODIUM CHLORIDE, SODIUM LACTATE, POTASSIUM CHLORIDE, AND CALCIUM CHLORIDE 125 ML/HR: .6; .31; .03; .02 INJECTION, SOLUTION INTRAVENOUS at 16:28

## 2019-03-12 NOTE — L&D DELIVERY NOTE
Delivery Summary - OB/GYN   Lanette Cordon 33 y o  female MRN: [id number]  Unit/Bed#: -01 Encounter: [id number]    Pre-delivery Diagnosis:   1  39w3d pregnancy      Post-delivery Diagnosis: same, delivered    Attending: Dr Shadi Trevino    Assistant(s): Jey Walton    Procedure:     Anesthesia:  epidural    Estimated Blood Loss:  Check QBL     Specimens:   1  Arterial and venous cord gases  2  Cord blood  3  Segment of umbilical cord  4  Placenta to storage     Complications:  None apparent    Findings:  1  Viable male  delivered on 19 at 1652 weighing pending lbs pendign oz;  Apgar scores of 9 at one minute and 9 at five minutes  2  Spontaneous delivery of placenta with centrally inserted 3-vessel cord  3  2nd degree perineal laceration, repaired with 3-0Vicryl       Disposition: Patient tolerated the procedure well and was recovering in labor and delivery room with family and  before being transferred to the post-partum floor  Procedure Details     Description of procedure     Patient admit for induction of labor and pitocin titration started and progressed regularly  During labor course on demand epidural was given  After amniotomy she has had some variable decelerations and progressed precipitously to complete   after pushing for 4 minutes, on 19 at 1652 patient delivered a viable male , weighing pending, Apgars of 9 (1 min) and 9 (5 min)  The fetal vertex delivered spontaneously  There was no nuchal cord  The anterior shoulder delivered atraumatically with maternal expulsive forces and the assistance of downward traction  The posterior shoulder delivered with maternal expulsive forces and the assistance of upward traction  The remainder of the fetus delivered spontaneously  Upon delivery, the infant was placed on the mothers abdomen and the cord was clamped and cut  The infant was noted to cry spontaneously and was moving all extremities appropriately  There was no evidence for injury  Awaiting nurse resuscitators evaluated the  at bedside  Arterial and venous cord blood gases and cord blood was collected for analysis  These were promptly sent to the lab  In the immediate post-partum, 30 units of IV pitocin was administered and the uterus was noted to contract down well with massage and pitocin  The placenta delivered spontaneously at [phone] and was noted to have a centrally inserted 3 vessel cord  The vagina, cervix, and perineum were inspected and there was noted to be 2nd degree perineal laceration  Laceration Repair  Patient was comfortable with epidural at that time  A second degree perineal laceration was identified and required repair  Laceration was repaired with 3-0 Vicryl in good to reapproximate the laceration  Good hemostasis was confirmed at the conclusion of this procedure  At the conclusion of the delivery, all needle, sponge, and instrument counts were noted to be correct  Patient tolerated the procedure well and was allowed to recover in labor and delivery room with family and  before being transferred to the post-partum floor  Dr Patt Car was present and participated in all key portions of the case      Babatunde Lr MD  OBGYN, PGY-1  3/12/2019  5:33 PM

## 2019-03-12 NOTE — ANESTHESIA PROCEDURE NOTES
CSE Block    Patient location during procedure: OB  Start time: 3/12/2019 12:29 PM  Reason for block: procedure for pain and at surgeon's request  Staffing  Anesthesiologist: Kath Dugan MD  Performed: anesthesiologist   Preanesthetic Checklist  Completed: patient identified, site marked, surgical consent, pre-op evaluation, timeout performed, IV checked, risks and benefits discussed and monitors and equipment checked  CSE  Patient position: sitting  Prep: ChloraPrep  Patient monitoring: cardiac monitor and continuous pulse ox  Approach: midline  Spinal Needle  Needle type: pencil-tip   Needle gauge: 27 G  Needle length: 10 cm  Epidural Needle  Injection technique: FLASH saline  Needle type: Tuohy   Needle gauge: 18 G  Location: lumbar (1-5)  Catheter  Catheter type: side hole  Catheter size: 20 G  Catheter at skin depth: 12 cm  Test dose: negative  Assessment  Injection Assessment:  negative aspiration for heme, no paresthesia on injection, positive aspiration for clear CSF and no pain on injection

## 2019-03-12 NOTE — PLAN OF CARE
Problem: Potential for Falls  Goal: Patient will remain free of falls  Description  INTERVENTIONS:  - Assess patient frequently for physical needs  -  Identify cognitive and physical deficits and behaviors that affect risk of falls  -  [place] fall precautions as indicated by assessment   - Educate patient/family on patient safety including physical limitations  - Instruct patient to call for assistance with activity based on assessment  - Modify environment to reduce risk of injury  - Consider OT/PT consult to assist with strengthening/mobility  3/12/2019 1815 by Alfred Chavez RN  Outcome: Progressing  3/12/2019 1716 by Alfred Chavez RN  Outcome: Progressing  3/12/2019 0801 by Alfred Chavez RN  Outcome: Progressing     Problem: Knowledge Deficit  Goal: Verbalizes understanding of labor plan  Description  Assess patient/family/caregiver's baseline knowledge level and ability to understand information  Provide education via patient/family/caregiver's preferred learning method at appropriate level of understanding  1  Provide teaching at level of understanding  2  Provide teaching via preferred learning method(s)  3/12/2019 1815 by Alfred Chavez RN  Outcome: Progressing  3/12/2019 1716 by Alfred Chavez RN  Outcome: Progressing  3/12/2019 0801 by Alfred Chavez RN  Outcome: Progressing  Goal: Patient/family/caregiver demonstrates understanding of disease process, treatment plan, medications, and discharge instructions  Description  Complete learning assessment and assess knowledge base    Interventions:  - Provide teaching at level of understanding  - Provide teaching via preferred learning methods  Outcome: Progressing     Problem: POSTPARTUM  Goal: Experiences normal postpartum course  Description  INTERVENTIONS:  - Monitor maternal vital signs  - Assess uterine involution and lochia  Outcome: Progressing  Goal: Appropriate maternal -  bonding  Description  INTERVENTIONS:  - Identify family support  - Assess for appropriate maternal/infant bonding   -Encourage maternal/infant bonding opportunities  - Referral to  or  as needed  Outcome: Progressing  Goal: Establishment of infant feeding pattern  Description  INTERVENTIONS:  - Assess breast/bottle feeding  - Refer to lactation as needed  Outcome: Progressing  Goal: Incision(s), wounds(s) or drain site(s) healing without S/S of infection  Description  INTERVENTIONS  - Assess and document risk factors for skin impairment      Outcome: Progressing     Problem: PAIN - ADULT  Goal: Verbalizes/displays adequate comfort level or baseline comfort level  Description  Interventions:  - Encourage patient to monitor pain and request assistance  - Assess pain using appropriate pain scale  - Administer analgesics based on type and severity of pain and evaluate response  - Implement non-pharmacological measures as appropriate and evaluate response  - Consider cultural and social influences on pain and pain management  - Notify physician/advanced practitioner if interventions unsuccessful or patient reports new pain  Outcome: Progressing     Problem: INFECTION - ADULT  Goal: Absence or prevention of progression during hospitalization  Description  INTERVENTIONS:  - Assess and monitor for signs and symptoms of infection  - Monitor lab/diagnostic results  - Monitor all insertion sites, i e  indwelling lines, tubes, and drains  - Monitor endotracheal (as able) and nasal secretions for changes in amount and color  - Carpenter appropriate cooling/warming therapies per order  - Administer medications as ordered  - Instruct and encourage patient and family to use good hand hygiene technique  - Identify and instruct in appropriate isolation precautions for identified infection/condition  Outcome: Progressing  Goal: Absence of fever/infection during neutropenic period  Description  INTERVENTIONS:  - Monitor WBC  - Implement neutropenic guidelines  Outcome: Progressing     Problem: SAFETY ADULT  Goal: Maintain or return to baseline ADL function  Description  INTERVENTIONS:  -  Assess patient's ability to carry out ADLs; assess patient's baseline for ADL function and identify physical deficits which impact ability to perform ADLs (bathing, care of mouth/teeth, toileting, grooming, dressing, etc )  - Assess/evaluate cause of self-care deficits   - Assess range of motion  - Assess patient's mobility; develop plan if impaired  - Assess patient's need for assistive devices and provide as appropriate  - Encourage maximum independence but intervene and supervise when necessary  ¯ Involve family in performance of ADLs  ¯ Assess for home care needs following discharge   ¯ Request OT consult to assist with ADL evaluation and planning for discharge  ¯ Provide patient education as appropriate  Outcome: Progressing  Goal: Maintain or return mobility status to optimal level  Description  INTERVENTIONS:  - Assess patient's baseline mobility status (ambulation, transfers, stairs, etc )    - Identify cognitive and physical deficits and behaviors that affect mobility  - Identify mobility aids required to assist with transfers and/or ambulation (gait belt, sit-to-stand, lift, walker, cane, etc )  - [place] fall precautions as indicated by assessment  - Record patient progress and toleration of activity level on Mobility SBAR; progress patient to next Phase/Stage  - Instruct patient to call for assistance with activity based on assessment  - Request Rehabilitation consult to assist with strengthening/weightbearing, etc   Outcome: Progressing     Problem: DISCHARGE PLANNING  Goal: Discharge to home or other facility with appropriate resources  Description  INTERVENTIONS:  - Identify barriers to discharge w/patient and caregiver  - Arrange for needed discharge resources and transportation as appropriate  - Identify discharge learning needs (meds, wound care, etc )  - Arrange for interpretive services to assist at discharge as needed  - Refer to Case Management Department for coordinating discharge planning if the patient needs post-hospital services based on physician/advanced practitioner order or complex needs related to functional status, cognitive ability, or social support system  Outcome: Progressing

## 2019-03-12 NOTE — DISCHARGE INSTRUCTIONS
Vaginal Delivery   AMBULATORY CARE:   What you need to know about vaginal delivery:  A vaginal delivery occurs when your baby is born through your vagina (birth canal)  How to prepare for vaginal delivery: You will not be able to eat while you are in active labor  Your healthcare provider can give you medicines for pain relief if you chose to have them  You may need medicine to induce (start) your labor if your labor is not moving forward  You may need to move in bed, stand, or walk to help your baby move into position for birth  What will happen during vaginal delivery:   · You can move into several positions during delivery  You can lie on your back, have your feet up in stirrups, or squat  You may feel pressure on your rectum  This pressure is caused by the movement of your baby's head down the birth canal  You may feel the urge to push  Your healthcare provider will tell you when to push, and guide your baby out of the birth canal  Healthcare providers may use forceps or suction to help deliver your baby  You may also need an episiotomy (incision) to make the vaginal opening larger  This will make more room for your baby  · After your baby is born, your healthcare provider will put clamps on the cord that connects your baby to the placenta  The cord is then cut  Your uterus continues to contract after delivery to push out the placenta  Your healthcare provider may close your episiotomy incision or any tears with stitches  What will happen after vaginal delivery:   · Healthcare providers will examine your baby  You may be able to hold your baby soon after he or she is born  After healthcare providers have checked that you and your baby are okay, you may be taken to another room  · A healthcare provider may massage your abdomen several times to make your uterus firm  This can be uncomfortable   You may have abdominal pains for up to 3 days after you give birth because your uterus is still víctor  The contractions help release blood from inside your uterus so it shrinks back to its normal size  These contractions may hurt more while you breastfeed your baby  · Your healthcare provider may suggest you get out of bed to sit in a chair or walk  Activity can help prevent blood clots  · You may be able to go home within 24 to 48 hours after delivery  If you need support at home, ask your healthcare provider about home visits by another healthcare provider  This healthcare provider can help you learn about breastfeeding, bottle feeding, baby care, and perineum care  Follow up with your healthcare provider:  Most women need to return 6 weeks after a vaginal delivery  Ask your healthcare provider how to care for your wounds or stitches, if you have them  Write down your questions so you remember to ask them during your visits  Seek care immediately if:   · Your leg feels warm, tender, and painful  It may look swollen and red  · You have a fever  · You are urinating very little, or not at all  · You have heavy vaginal bleeding that fills 1 or more sanitary pads in 1 hour  · You feel weak, dizzy, or faint  Contact your healthcare provider if:   · Your abdominal or perineal pain does not go away, or gets worse  · You feel depressed  · You have questions or concerns about your condition or care  Medicines:  · NSAIDs , such as ibuprofen, help decrease swelling, pain, and fever  This medicine is available with or without a doctor's order  NSAIDs can cause stomach bleeding or kidney problems in certain people  If you take blood thinner medicine, always ask your healthcare provider if NSAIDs are safe for you  Always read the medicine label and follow directions  · Stool softeners  make it easier for you to have a bowel movement  You may need this medicine to treat or prevent constipation  · Take your medicine as directed    Contact your healthcare provider if you think your medicine is not helping or if you have side effects  Tell him or her if you are allergic to any medicine  Keep a list of the medicines, vitamins, and herbs you take  Include the amounts, and when and why you take them  Bring the list or the pill bottles to follow-up visits  Carry your medicine list with you in case of an emergency  Activity:  Rest as much as possible  Try to keep all activities short  You may be able to do some exercise soon after you have your baby  Talk with your healthcare provider before you start exercising  If you work outside the home, ask when you can return to your job  Kegel exercises:  Kegel exercises may help your vaginal and rectal muscles heal faster  You can do Kegel exercises by tightening and relaxing the muscles around your vagina  Kegel exercises help make the muscles stronger  Breast care:  When your milk comes in, your breasts may feel full and hard  Ask how to care for your breasts, even if you are not breastfeeding  Constipation:  You may have constipation for a period of time after you have your baby  Do not try to push the bowel movement out if it is too hard  High-fiber foods and extra liquids can help you prevent constipation  Examples of high-fiber foods are fruit and bran  Prune juice and water are good liquids to drink  You may also be told to take over-the-counter fiber and stool softener medicines  Take these items as directed  Ask how to prevent or treat hemorrhoids  Perineum care: Your perineum is the area between your vagina and anus  Keep the area clean and dry  This will help it heal and prevent infection  Wash the area gently with soap and water when you bathe or shower  Rinse your perineum with warm water after you urinate or have a bowel movement  Your healthcare provider may suggest you use a warm sitz bath to help decrease pain  To take a sitz bath, fill a bathtub with 4 to 6 inches of warm water   You may also use a sitz bath pan that fits inside the toilet  Sit in the sitz bath for 20 minutes  Do this 2 to 3 times a day, or as directed  The warm water can help decrease pain and swelling  Vaginal discharge: You will have vaginal discharge, called lochia, after your delivery  The lochia is red or dark brown with clots for 1 to 3 days after the birth  The amount will decrease and turn pale pink or brown for 3 to 10 days  It will turn white or yellow on the 10th or 14th day  Lochia is usually gone within 3 weeks  Use a sanitary pad rather than a tampon to prevent a vaginal infection  You will have lochia for up to 3 weeks after your baby is born  Monthly periods: Your period may start again within 7 to 9 weeks after your baby is born  If you are breastfeeding, it may take longer for your period to start again  You can still get pregnant again even though you do not have your monthly period  Talk with your healthcare provider about a birth control method if you do not want to get pregnant  Mood changes: Many new mothers have some kind of mood changes after delivery  Some of these changes occur because of lack of sleep, hormone changes, and caring for a new baby  Some mood changes can be more serious, such as postpartum depression  Talk with your healthcare provider if you feel unable to care for yourself or your baby  Sexual activity:  Do not have sex until your healthcare provider says it is okay  You may notice you have a decreased desire for sex, or sex may be painful  You may need to use a vaginal lubricant (gel) to help make sex more comfortable  © 2017 [address]  Information is for End User's use only and may not be sold, redistributed or otherwise used for commercial purposes  All illustrations and images included in CareNotes® are the copyrighted property of [employer]  or Martín Francis  The above information is an  only  It is not intended as medical advice for individual conditions or treatments   Talk to your doctor, nurse or pharmacist before following any medical regimen to see if it is safe and effective for you  Breastfeeding and Breast Engorgement   WHAT YOU NEED TO KNOW:   Breast engorgement develops when too much milk builds up in your breast  It is normal for your breasts to feel swollen, heavy, and tender when your milk comes in  This is called breast fullness  When your breast starts to feel painful and hard, the fullness has developed into engorgement  Breast engorgement usually happens 3 to 5 days after you give birth  Engorgement can happen if you are not breastfeeding or expressing milk often, or produce a lot of milk  Your baby may have a hard time latching on (attaching) to your breast to feed  Without treatment, engorgement can lead to plugged milk ducts or a breast infection called mastitis  DISCHARGE INSTRUCTIONS:   Return to the emergency department if:   · You have a fever with chills or body aches  · You have pain and swelling in one or both breasts that keeps you from breastfeeding  Contact your healthcare provider if:   · You have a tender breast lump that grows slowly and usually forms on one side of your breast     · You have a small, white bump on your nipple  · Your symptoms do not get better within 24 hours  · You have questions or concerns about your condition or care  Manage your symptoms:   · Breastfeed or pump every 2 or 3 hours  Frequent breastfeeding helps decrease engorgement discomfort  Express or pump milk from your breasts before you breastfeed  This will help soften your breast and your nipple, and allow your baby to latch on better  · Empty your breasts completely  Take your time when you breastfeed to allow your baby to empty your breast  Try not to switch breasts too early  Express or pump after you breastfeed if your baby is not emptying your breasts when he feeds      · Massage your breast   Breast massage helps empty your engorged breast and decrease pain  Gently massage your breast before and during breastfeeding to help increase your milk flow  Gently stroke your breast, starting from the outer areas and working your way toward the nipple  Breast massage may also help prevent breast engorgement if done in the first few days after you give birth  · Apply a cool compress in between feedings  The cold may help decrease swelling and pain in your engorged breast  Wet a washcloth in cold water, wring it out, and place it on your breast  Ask how long and how often to use a cool compress  · Wear a supportive bra  The bra should fit well but not be too tight  · Apply warmth to your breast before you breastfeed  Put a warm, wet cloth on your breast or take a warm shower  This can help increase your milk flow  Follow up with your healthcare provider as directed:  Write down your questions so you remember to ask them during your visits  For more information:   · [employer] [address] [place] [address]  Phone: 2- 898 - 270-2323  Web Address: http://www [employer]/  · [address] [place]  Phone: 3- 875 - 756-6027  Phone: 2- 929 - 476-3717  Web Address: http://www [facility]/  org  © 2017 [facility] Information is for End User's use only and may not be sold, redistributed or otherwise used for commercial purposes  All illustrations and images included in CareNotes® are the copyrighted property of A D A M [employer] Inc  or Martín Francis  The above information is an  only  It is not intended as medical advice for individual conditions or treatments  Talk to your doctor, nurse or pharmacist before following any medical regimen to see if it is safe and effective for you

## 2019-03-12 NOTE — ANESTHESIA PREPROCEDURE EVALUATION
Review of Systems/Medical History  Patient summary reviewed  Chart reviewed  No history of anesthetic complications     Cardiovascular  Negative cardio ROS    Pulmonary  Negative pulmonary ROS        GI/Hepatic  Negative GI/hepatic ROS          Negative  ROS        Endo/Other  History of thyroid disease , hypothyroidism,   Comment: Thyroid cancer s/p thyriodectomy    GYN  Currently pregnant , Prior pregnancy/OB history : 3 Parity: 1,          Hematology  Anemia ,     Musculoskeletal  Negative musculoskeletal ROS        Neurology  Negative neurology ROS      Psychology   Negative psychology ROS              Physical Exam    Airway    Mallampati score: II  TM Distance: >3 FB  Neck ROM: full     Dental   No notable dental hx     Cardiovascular  Comment: Negative ROS, Rhythm: regular, Rate: normal, Cardiovascular exam normal    Pulmonary  Pulmonary exam normal Breath sounds clear to auscultation,     Other Findings        Anesthesia Plan  ASA Score- 2     Anesthesia Type- epidural and spinal with ASA Monitors  Additional Monitors:   Airway Plan:         Plan Factors-    Induction-     Postoperative Plan-     Informed Consent- Anesthetic plan and risks discussed with patient  Recent labs personally reviewed:  Lab Results   Component Value Date    WBC 9 29 2019    HGB 10 7 (L) 2019     2019     No results found for: NA, K, BUN, CREATININE, GLUCOSE  No results found for: PTT   No results found for: INR    Blood type A    No results found for: Frida Larson MD, have personally seen and evaluated the patient prior to anesthetic care  I have reviewed the pre-anesthetic record, and other medical records if appropriate to the anesthetic care  If a CRNA is involved in the case, I have reviewed the CRNA assessment, if present, and agree   Risks/benefits and alternatives discussed with patient including possible PONV, sore throat, and possibility of rare anesthetic and surgical emergencies

## 2019-03-12 NOTE — OB LABOR/OXYTOCIN SAFETY PROGRESS
Oxytocin Safety Progress Check Note - Ward [place] 33 y o  female MRN: [id number]    Unit/Bed#: -01 Encounter: [id number]    OB History    Para Term  AB Living   3 1 1 0 1 1   SAB TAB Ectopic Multiple Live Births   0 0 0 0 1     Gestational Age: 39w3d  Dose (abhijeet-units/min) Oxytocin: 6 abhijeet-units/min  Contraction Frequency (minutes): 2-3 5  Contraction Quality: Moderate  Tachysystole: No   Dilation: 3-4        Effacement (%): 60  Station: -3  Baseline Rate: 135 bpm  Fetal Heart Rate: 142 BPM  FHR Category: Category I          Notes/comments:      Pt is comfortable, /cat 1 toco 3-4 minutes       Kezia Mason MD  15:28 AM

## 2019-03-12 NOTE — PLAN OF CARE
Problem: Potential for Falls  Goal: Patient will remain free of falls  Description  INTERVENTIONS:  - Assess patient frequently for physical needs  -  Identify cognitive and physical deficits and behaviors that affect risk of falls  -  [place] fall precautions as indicated by assessment   - Educate patient/family on patient safety including physical limitations  - Instruct patient to call for assistance with activity based on assessment  - Modify environment to reduce risk of injury  - Consider OT/PT consult to assist with strengthening/mobility  3/12/2019 1716 by Rufus Whitman RN  Outcome: Progressing  3/12/2019 0801 by Rufus Whitman RN  Outcome: Progressing     Problem: Knowledge Deficit  Goal: Verbalizes understanding of labor plan  Description  Assess patient/family/caregiver's baseline knowledge level and ability to understand information  Provide education via patient/family/caregiver's preferred learning method at appropriate level of understanding  1  Provide teaching at level of understanding  2  Provide teaching via preferred learning method(s)    3/12/2019 1716 by Rufus Whitman RN  Outcome: Progressing  3/12/2019 0801 by Rufus Whitman RN  Outcome: Progressing     Problem: BIRTH - VAGINAL/ SECTION  Goal: Fetal and maternal status remain reassuring during the birth process  Description  INTERVENTIONS:  - Monitor vital signs  - Monitor fetal heart rate  - Monitor uterine activity  - Monitor labor progression (vaginal delivery)  - DVT prophylaxis  - Antibiotic prophylaxis  3/12/2019 1716 by Rufus Whitman RN  Outcome: Completed  3/12/2019 0801 by Rufus Whitman RN  Outcome: Progressing  Goal: Emotionally satisfying birthing experience for mother/fetus  Description  Interventions:  - Assess, plan, implement and evaluate the nursing care given to the patient in labor  - Advocate the philosophy that each childbirth experience is a unique experience and support the family's chosen level of involvement and control during the labor process   - Actively participate in both the patient's and family's teaching of the birth process  - Consider cultural, Buddhism and age-specific factors and plan care for the patient in labor  3/12/2019 1716 by Fany Bruce RN  Outcome: Completed  3/12/2019 0801 by Fany Bruce RN  Outcome: Progressing     Problem: Labor & Delivery  Goal: Manages discomfort  Description  Assess and monitor for signs and symptoms of discomfort  Assess patient's pain level regularly and per hospital policy  Administer medications as ordered  Support use of nonpharmacological methods to help control pain such as distraction, imagery, relaxation, and application of heat and cold  Collaborate with interdisciplinary team and patient to determine appropriate pain management plan  1  Include patient in decisions related to comfort  2  Offer non-pharmacological pain management interventions  3  Report ineffective pain management to physician  3/12/2019 1716 by Fany Bruce RN  Outcome: Completed  3/12/2019 0801 by Fany Bruce RN  Outcome: Progressing  Goal: Patient vital signs are stable  Description  1  Assess vital signs - vaginal delivery    3/12/2019 1716 by Fany Bruce RN  Outcome: Completed  3/12/2019 0801 by Fany Bruce RN  Outcome: Progressing

## 2019-03-12 NOTE — PROGRESS NOTES
Progress Note    Nicol James is a 34 y/o  at 39w2d here for elective induction of labor    Upon cervical exam this evening, patient was found to be 4/80/-2  Given that patient does not require cervical ripening overnight, patient was presented with the option of SD pitocin overnight vs going home and returning in the morning for pitocin and AROM  Patient and  decided to go home and return in the morning      Vitals:    19 2049   BP: 133/70   Pulse: (!) 109   Resp: 18   Temp: 97 7 °F (36 5 °C)         FHT: 150s bpm, reactive, 15x15 accerlations  [place]: irriatbility    A/P: Patient has decided to go home and return in the morning  Plan for pit and AROM in the morning      D/w Dr Tiara Cox

## 2019-03-12 NOTE — DISCHARGE INSTRUCTIONS
Pregnancy at 44 to 40 Weeks   [address] [place], [place] [address] :   You are now getting close to your due date  Your due date is just an estimate of when your baby will be born  Your baby may be born before or after your due date  Your breathing may be easier if your baby has moved down into a head-down position  You may need to urinate more often because the baby may be pressing on your bladder  You may also feel more discomfort and tire easily  You may also be having trouble sleeping  DISCHARGE INSTRUCTIONS:   Seek care immediately if:   · You develop a severe headache that does not go away  · You have new or increased vision changes, such as blurred or spotted vision  · You have new or increased swelling in your face or hands  · You have vaginal spotting or bleeding  · Your water broke or you feel warm water gushing or trickling from your vagina  Contact your healthcare provider if:   · You have more than 5 contractions in 1 hour  · You notice any changes in your baby's movements  · You have abdominal cramps, pressure, or tightening  · You have a change in vaginal discharge  · You have chills or a fever  · You have vaginal itching, burning, or pain  · You have yellow, green, white, or foul-smelling vaginal discharge  · You have pain or burning when you urinate, less urine than usual, or pink or bloody urine  · You have questions or concerns about your condition or care  How to care for yourself at this stage of your pregnancy:   · Eat a variety of healthy foods  Healthy foods include fruits, vegetables, whole-grain breads, low-fat dairy foods, beans, lean meats, and fish  Drink liquids as directed  Ask how much liquid to drink each day and which liquids are best for you  Limit caffeine to less than 200 milligrams each day  Limit your intake of fish to 2 servings each week  Choose fish low in mercury such as canned light tuna, shrimp, crab, salmon, cod, or tilapia   Do not  eat fish high in mercury such as swordfish, tilefish, clayton mackerel, and shark  · Take prenatal vitamins as directed  Your need for certain vitamins and minerals, such as folic acid, increases during pregnancy  Prenatal vitamins provide some of the extra vitamins and minerals you need  Prenatal vitamins may also help to decrease the risk of certain birth defects  · Rest as needed  Put your feet up if you have swelling in your ankles and feet  · Do not smoke  If you smoke, it is never too late to quit  Smoking increases your risk of a miscarriage and other health problems during your pregnancy  Smoking can cause your baby to be born too early or weigh less at birth  Ask your healthcare provider for information if you need help quitting  · Do not drink alcohol  Alcohol passes from your body to your baby through the placenta  It can affect your baby's brain development and cause fetal alcohol syndrome (FAS)  FAS is a group of conditions that causes mental, behavior, and growth problems  · Talk to your healthcare provider before you take any medicines  Many medicines may harm your baby if you take them when you are pregnant  Do not take any medicines, vitamins, herbs, or supplements without first talking to your healthcare provider  Never use illegal or street drugs (such as marijuana or cocaine) while you are pregnant  · Talk to your healthcare provider before you travel  You may not be able to travel in an airplane after 36 weeks  He may also recommend that you avoid long road trips  Safety tips:   · Avoid hot tubs and saunas  Do not use a hot tub or sauna while you are pregnant, especially during your first trimester  Hot tubs and saunas may raise your baby's temperature and increase the risk of birth defects  · Avoid toxoplasmosis  This is an infection caused by eating raw meat or being around infected cat feces  It can cause birth defects, miscarriages, and other problems   Wash your hands after you touch raw meat  Make sure any meat is well-cooked before you eat it  Avoid raw eggs and unpasteurized milk  Use gloves or ask someone else to clean your cat's litter box while you are pregnant  · Ask your healthcare provider about travel  The most comfortable time to travel is during the second trimester  Ask your healthcare provider if you can travel after 36 weeks  You may not be able to travel in an airplane after 36 weeks  He may also recommend that you avoid long road trips  Changes that are happening with your baby: Your baby is ready to be born  At birth, your baby may weigh about 6 to 9 pounds and be about 19 to 21 inches long  Your baby may be in a head-down position  Your baby will also rest lower in your abdomen  What you need to know about prenatal care: Your healthcare provider will check your blood pressure and weight  You may also need the following:  · A urine test  may also be done to check for sugar and protein  These can be signs of gestational diabetes or infection  Protein in your urine may also be a sign of preeclampsia  Preeclampsia is a condition that can develop during week 20 or later of your pregnancy  It causes high blood pressure, and it can cause problems with your kidneys and other organs  · Your baby's heart rate  will be checked  © 2017 [address] Information is for End User's use only and may not be sold, redistributed or otherwise used for commercial purposes  All illustrations and images included in CareNotes® are the copyrighted property of A D A M , Inc  or Martín Francis  The above information is an  only  It is not intended as medical advice for individual conditions or treatments  Talk to your doctor, nurse or pharmacist before following any medical regimen to see if it is safe and effective for you

## 2019-03-12 NOTE — DISCHARGE SUMMARY
Discharge Summary - OB/GYN   Earlis Castleman 33 y o  female MRN: [id number]  Unit/Bed#: -01 Encounter: [id number]      Admission Date: 3/12/2019     Discharge Date: 3/14/2019    Admitting Diagnosis:   1  Pregnancy at 39w3d  2  Hypothyroidism    Discharge Diagnosis:   Same, delivered    Procedures: spontaneous vaginal delivery    Attending: Andre Burt MD  Discharge Attending: Gale Ortiz MD    Hospital Course:     Earlis Castleman is a 33 y o   at 39w3d wks who was initially admitted for induction labor  Pitocin titration started and progressed regularly  During labor course on demand epidural was given  After amniotomy she has had some variable decelerations and progressed precipitously to complete   after pushing for 4 minutes,She delivered a viable male  on 3/102/2019 at 1652  Weight 9lbs 11oz via spontaneous vaginal delivery  Apgars were 9 (1 min) and 9 (5 min)   was transferred to  nursery  Patient tolerated the procedure well and was transferred to recovery in stable condition  Her post-partum course was uncomplicated  Her post-partum pain was well controlled with oral analgesics  On day of discharge, she was ambulating and able to reasonably perform all ADLs  She was voiding and had appropriate bowel function  Pain was well controlled  She was discharged home on post-partum day #2 without complications  Patient was instructed to follow up with her OB as an outpatient and was given appropriate warnings to call provider if she develops signs of infection or uncontrolled pain  Complications: none apparent    Condition at discharge: stable     Discharge instructions/Information to patient and family:   See after visit summary for information provided to patient and family  Provisions for Follow-Up Care:  See after visit summary for information related to follow-up care and any pertinent home health orders        Disposition: Home    Planned Readmission: No    Discharge Medications: For a complete list of the patient's medications, please refer to her med rec

## 2019-03-12 NOTE — OB LABOR/OXYTOCIN SAFETY PROGRESS
Oxytocin Safety Progress Check Note - Precious Combs 33 y o  female MRN: [id number]    Unit/Bed#: -01 Encounter: [id number]    OB History    Para Term  AB Living   3 1 1 0 1 1   SAB TAB Ectopic Multiple Live Births   0 0 0 0 1     Gestational Age: 39w3d  Dose (abhijeet-units/min) Oxytocin: 8 abhijeet-units/min  Contraction Frequency (minutes): 2 5-3  Contraction Quality: Moderate  Tachysystole: No   Dilation: 4-5        Effacement (%): 70  Station: -2  Baseline Rate: 130 bpm  Fetal Heart Rate: 142 BPM  FHR Category: Category I          Notes/comments:      Pt is comfortable after epidural  She has dilation as above shown   /cat1 toco 3-4 min       Coy Kirk MD [id number] 3:79 PM

## 2019-03-12 NOTE — OB LABOR/OXYTOCIN SAFETY PROGRESS
Oxytocin Safety Progress Check Note - Ishan Carrillo 33 y o  female MRN: [id number]    Unit/Bed#: -01 Encounter: [id number]    OB History    Para Term  AB Living   3 1 1 0 1 1   SAB TAB Ectopic Multiple Live Births   0 0 0 0 1     Gestational Age: 39w3d  Dose (abhijeet-units/min) Oxytocin: 8 abhijeet-units/min  Contraction Frequency (minutes): 2  Contraction Quality: Moderate  Tachysystole: No   Dilation: 3-4        Effacement (%): 60  Station: -3  Baseline Rate: 130 bpm  Fetal Heart Rate: 137 BPM  FHR Category: Category I          Notes/comments:      Patient feeling more contractiong demand epidural will be given, D/w Dr Mane Paige MD [id number] 56:55 AM

## 2019-03-12 NOTE — H&P
H&P Exam - Obstetrics   Manan Vargas 33 y o  female MRN: [id number]  Unit/Bed#: -01 Encounter: [id number]      History of Present Illness     Chief Complaint: Induction of labor    HPI:  Manan Vargas is a 33 y o   female with an ELIEZER of 3/16/2019, by Ultrasound at 39w3d weeks gestation who is being admitted for elective induction of labor  Decline vaginal bleeding,  LOF and reporting good fetal movements  She has Hx of hypothyroidism  Contractions: yes  Loss of fluid: no  Vaginal bleeding: no  Fetal movement: good    She is a SLOGA patient  PREGNANCY COMPLICATIONS:   1) Hypotyhroidism    OB History    Para Term  AB Living   3 1 1   1 1   SAB TAB Ectopic Multiple Live Births           1      # Outcome Date GA Lbr Corey/2nd Weight Sex Delivery Anes PTL Lv   3 Current            2 Term 17 40w5d  4139 g (9 lb 2 oz) M Vag-Vacuum EPI  AMY   1 AB  5w0d              Baby complications/comments: none    Review of Systems   HENT: Negative  Respiratory: Negative  Cardiovascular: Negative  Gastrointestinal: Negative  Genitourinary: Negative  Neurological: Negative  Psychiatric/Behavioral: Negative  Historical Information   Past Medical History:   Diagnosis Date    Anemia     hx of    Hypothyroidism affecting pregnancy 2017    Ovarian cyst     age 13    Status post vacuum-assisted vaginal delivery 2017    Thyroid [facility] ([facility] [address] )     thyroid CA- throidectomy 2017    Urinary tract infection     hx of uti's    Varicella     childhood chickenpox     Past Surgical History:   Procedure Laterality Date    THYROIDECTOMY  2017    VAGINAL DELIVERY  2017    WISDOM TOOTH EXTRACTION       Social History   Social History     Substance and Sexual Activity   Alcohol Use No     Social History     Substance and Sexual Activity   Drug Use Not Currently    Types: Marijuana    Comment: last  used 10 yrs ago       Social History     Tobacco Use   Smoking Status Former Smoker    Last attempt to quit: 1/9/2016    Years since quitting: 3 1   Smokeless Tobacco Never Used   Tobacco Comment    quit 2016     Family History: non-contributory    Meds/Allergies      Medications Prior to Admission   Medication    ferrous sulfate 325 (65 Fe) mg tablet    levothyroxine 200 mcg tablet    levothyroxine 50 mcg tablet    Prenatal Vit-Fe Fumarate-FA (PRENATAL VITAMIN) 27-0 8 MG TABS        Allergies   Allergen Reactions    Aspirin Anaphylaxis and Angioedema       OBJECTIVE:    Vitals: Blood pressure 149/82, pulse 90, temperature 97 7 °F (36 5 °C), temperature source Oral, resp  rate 18, height 5' 6" (1 676 m), weight 105 kg (232 lb), currently breastfeeding  Body mass index is 37 45 kg/m²  Physical Exam   Constitutional: She is oriented to person, place, and time  She appears well-developed and well-nourished  No distress  Cardiovascular: Normal rate, regular rhythm and normal heart sounds  Pulmonary/Chest: Effort normal and breath sounds normal    Abdominal: Soft  Musculoskeletal: Normal range of motion  Neurological: She is alert and oriented to person, place, and time  Skin: Skin is warm  She is not diaphoretic  Psychiatric: She has a normal mood and affect  Her behavior is normal          [place]: deferred  Nitrazine: deferred    Cervix:  Dilation: 3-4  Effacement (%): 60  Station: -3  Cervical Characteristics: Anterior    Fetal heart rate:   Baseline Rate: 130 bpm  Variability: Moderate 6-25 bpm  Accelerations: 15 x 15 or greater  Decelerations: None  FHR Category: Category I    [place]:   Contraction Frequency (minutes): 3-4  Contraction Duration (seconds): 40  Contraction Quality: Moderate    EFW: 7 6    GBS: Negative    Prenatal Labs: I have personally reviewed pertinent reports    , Blood Type:   Lab Results   Component Value Date/Time    ABO Grouping A 08/15/2018 10:05 AM     , D (Rh type):   Lab Results   Component Value Date/Time    Rh Factor Positive 08/15/2018 10:05 AM     , Antibody Screen: No results found for: ANTIBODYSCR , HCT/HGB:   Lab Results   Component Value Date/Time    Hematocrit 33 0 (L) 2018 01:00 PM    Hemoglobin 10 2 (L) 2018 01:00 PM      , MCV:   Lab Results   Component Value Date/Time    MCV 86 2018 01:00 PM      , Platelets:   Lab Results   Component Value Date/Time    Platelets 603  01:00 PM      , 1 hour Glucola:   Lab Results   Component Value Date/Time    Glucose 94 2018 01:00 PM   ,Rubella:   Lab Results   Component Value Date/Time    Rubella IgG Quant 69 9 08/15/2018 10:05 AM        , VDRL/RPR:   Lab Results   Component Value Date/Time    RPR Non-Reactive 2018 01:00 PM      , Urine Culture/Screen:   Lab Results   Component Value Date/Time    Urine Culture 30,000-39,000 cfu/ml  2018 12:06 PM   , Hep B:   Lab Results   Component Value Date/Time    Hepatitis B Surface Ag Non-reactive 08/15/2018 10:05 AM   HIV:   Lab Results   Component Value Date/Time    HIV-1/HIV-2 Ab Non-Reactive 08/15/2018 10:05 AM     Gonorrhea:   Lab Results   Component Value Date/Time    N gonorrhoeae, DNA Probe N  gonorrhoeae Amplified DNA Negative 2018 02:37 PM     , Group B Strep:    Lab Results   Component Value Date/Time    Strep Grp B PCR Negative for Beta Hemolytic Strep Grp B by PCR 2019 12:10 PM              Assessment/Plan     ASSESSMENT:  27yo  at 39w3d weeks gestation who is being admitted for elective induction of labor  PLAN:   1) Admit   2) CBC, RPR, Blood Type   3) Start with pitocin titration   4) GBS negativestatus: no PCN for prophylaxis    5) Analgesia and/or epidural at patient request   6) Anticipate    7) Discussed with Dr Osorio Zaidi      This patient will be an INPATIENT  and I certify the anticipated length of stay is >2 Midnights      Laurie Eduardo MD    8:63 AM

## 2019-03-12 NOTE — PLAN OF CARE
Problem: Potential for Falls  Goal: Patient will remain free of falls  Description  INTERVENTIONS:  - Assess patient frequently for physical needs  -  Identify cognitive and physical deficits and behaviors that affect risk of falls  -  [place] fall precautions as indicated by assessment   - Educate patient/family on patient safety including physical limitations  - Instruct patient to call for assistance with activity based on assessment  - Modify environment to reduce risk of injury  - Consider OT/PT consult to assist with strengthening/mobility  Outcome: Progressing     Problem: BIRTH - VAGINAL/ SECTION  Goal: Fetal and maternal status remain reassuring during the birth process  Description  INTERVENTIONS:  - Monitor vital signs  - Monitor fetal heart rate  - Monitor uterine activity  - Monitor labor progression (vaginal delivery)  - DVT prophylaxis  - Antibiotic prophylaxis  Outcome: Progressing  Goal: Emotionally satisfying birthing experience for mother/fetus  Description  Interventions:  - Assess, plan, implement and evaluate the nursing care given to the patient in labor  - Advocate the philosophy that each childbirth experience is a unique experience and support the family's chosen level of involvement and control during the labor process   - Actively participate in both the patient's and family's teaching of the birth process  - Consider cultural, Taoist and age-specific factors and plan care for the patient in labor  Outcome: Progressing     Problem: Knowledge Deficit  Goal: Verbalizes understanding of labor plan  Description  Assess patient/family/caregiver's baseline knowledge level and ability to understand information  Provide education via patient/family/caregiver's preferred learning method at appropriate level of understanding  1  Provide teaching at level of understanding  2  Provide teaching via preferred learning method(s)    Outcome: Progressing     Problem: Labor & Delivery  Goal: Manages discomfort  Description  Assess and monitor for signs and symptoms of discomfort  Assess patient's pain level regularly and per hospital policy  Administer medications as ordered  Support use of nonpharmacological methods to help control pain such as distraction, imagery, relaxation, and application of heat and cold  Collaborate with interdisciplinary team and patient to determine appropriate pain management plan  1  Include patient in decisions related to comfort  2  Offer non-pharmacological pain management interventions  3  Report ineffective pain management to physician  Outcome: Progressing  Goal: Patient vital signs are stable  Description  1  Assess vital signs - vaginal delivery    Outcome: Progressing

## 2019-03-12 NOTE — ANESTHESIA POSTPROCEDURE EVALUATION
Post-Op Assessment Note    CV Status:  Stable  Pain Score: 0    Pain management: adequate     Mental Status:  Alert and awake   Hydration Status:  Euvolemic   PONV Controlled:  None   Airway Patency:  Patent   Post Op Vitals Reviewed: Yes      Staff: Anesthesiologist     Post-op block assessment: catheter intact and no complications        /90 (03/12/19 1742)    Temp      Pulse 69 (03/12/19 1742)   Resp      SpO2

## 2019-03-12 NOTE — LACTATION NOTE
This note was copied from a baby's chart  CONSULT - LACTATION  Baby Boy  Catalino Kidney) [place] 0 days male MRN: [id number]    [facility] Room / Bed: (N)/ 303(N) Encounter: [id number]    Maternal Information     MOTHER:  Chrissy Reese  Maternal Age: 33 y o    OB History: #: 1, Date: 1, Sex: None, Weight: None, GA: 5w0d, Delivery: None, Apgar1: None, Apgar5: None, Living: None, Birth Comments: None    #: 2, Date: 17, Sex: Male, Weight: 4139 g (9 lb 2 oz), GA: 40w5d, Delivery: Vaginal, Vacuum (Extractor), Apgar1: None, Apgar5: None, Living: Living, Birth Comments: None    #: 3, Date: 19, Sex: Male, Weight: 4394 g (9 lb 11 oz), GA: 39w3d, Delivery: Vaginal, Spontaneous, Apgar1: 9, Apgar5: 9, Living: Living, Birth Comments: None   Previouse breast reduction surgery?  No    Lactation history:   Has patient previously breast fed: Yes   How long had patient previously breast fed: 1 month 19 months ago   Previous breast feeding complications: Low milk supply     Past Surgical History:   Procedure Laterality Date    THYROIDECTOMY  2017    VAGINAL DELIVERY  2017    WISDOM TOOTH EXTRACTION         Birth information:  YOB: 2018   Time of birth: 4:52 PM   Sex: male   Delivery type: Vaginal, Spontaneous   Birth Weight: 4394 g (9 lb 11 oz)   Percent of Weight Change: 0%     Gestational Age: 38w3d   [unfilled]    Assessment     Breast and nipple assessment: normal assessment    [place] Assessment: normal assessment    Feeding assessment: feeding well  LATCH:  Latch: Grasps breast, tongue down, lips flanged, rhythmic sucking   Audible Swallowing: Spontaneous and intermittent (24 hours old)   Type of Nipple: Everted (After stimulation)   Comfort (Breast/Nipple): Soft/non-tender   Hold (Positioning): No assist from staff, mother able to position/hold infant   LATCH Score: 10          Feeding recommendations:  breast feed on demand     Spent time working on different positions that would facilitate better transfer of breastmilk  Gave suggestions on how to accomplish deep latch by starting latch with infant's nose at the nipple  Then, stroke the upper lip with the nipple  As infant opens mouth, apply the areola and nipple on on top of the tongue so that the nipple impacts with the soft palate to increase comfort with the feeding and to keep infant interested in the feeding longer  Cross cradle hold  Discussed 2nd night syndrome and ways to calm infant  Hand out given  Information on hand expression given  Discussed benefits of knowing how to manually express breast including stimulating milk supply, softening nipple for latch and evacuating breast in the event of engorgement  Met with mother  Provided mother with Ready, Set, Baby booklet  Discussed Skin to Skin contact an benefits to mom and baby  Talked about the delay of the first bath until baby has adjusted  Spoke about the benefits of rooming in  Feeding on cue and what that means for recognizing infant's hunger  Avoidance of pacifiers for the first month discussed  Talked about exclusive breastfeeding for the first 6 months  Positioning and latch reviewed as well as showing images of other feeding positions  Discussed the properties of a good latch in any position  Reviewed hand/manual expression  Discussed s/s that baby is getting enough milk and some s/s that breastfeeding dyad may need further help  Gave information on common concerns, what to expect the first few weeks after delivery, preparing for other caregivers, and how partners can help  Resources for support also provided  Encouraged parents to watch breastfeeding class in the education area of My Chart Bedside  Power point handout for breastfeeding class in My Chart Bedside given to family so they can follow along and write down questions they may have      Encouraged parents to call for assistance, questions, and concerns about breastfeeding  Extension provided      Asher Roberts RN 3/12/2019 6:55 PM

## 2019-03-12 NOTE — OB LABOR/OXYTOCIN SAFETY PROGRESS
Oxytocin Safety Progress Check Note - Zoie Hall 33 y o  female MRN: [id number]    Unit/Bed#: -01 Encounter: [id number]    OB History    Para Term  AB Living   3 1 1 0 1 1   SAB TAB Ectopic Multiple Live Births   0 0 0 0 1     Gestational Age: 39w3d  Dose (abhijeet-units/min) Oxytocin: 12 abhijeet-units/min  Contraction Frequency (minutes): 1 5-3  Contraction Quality: Moderate  Tachysystole: No   Dilation: 6        Effacement (%): 80  Station: -1  Baseline Rate: 130 bpm  Fetal Heart Rate: 129 BPM  FHR Category: Category I          Notes/comments:      Pt is comfortable with epidural, amniotomy performed clear moderate amount of amniotic fluid appreciated, /cat1 toco 3-4       Tenisha Costa MD [id number] 6:95 PM

## 2019-03-12 NOTE — PROGRESS NOTES
Huddle: Category 2 EFM    Participants: Nurse Arash Rojo    Indication: Recurrent late decelerations    Fetal heart tones: Baseline 120, moderate to minimal variability, +acceleration with scalp stim    Assessment and plan:  Patient 9+ cm almost ready to deliver  Pitocin turned off, postion changes, increase IVF  Anticipate SAVD

## 2019-03-13 PROBLEM — Z34.93 ENCOUNTER FOR SUPERVISION OF NORMAL PREGNANCY IN THIRD TRIMESTER: Status: RESOLVED | Noted: 2019-01-17 | Resolved: 2019-03-13

## 2019-03-13 PROBLEM — O26.892 PELVIC PAIN AFFECTING PREGNANCY IN SECOND TRIMESTER, ANTEPARTUM: Status: RESOLVED | Noted: 2018-10-25 | Resolved: 2019-03-13

## 2019-03-13 PROBLEM — O99.283 THYROID DISEASE DURING PREGNANCY IN THIRD TRIMESTER: Status: RESOLVED | Noted: 2018-09-11 | Resolved: 2019-03-13

## 2019-03-13 PROBLEM — O26.843 UTERINE SIZE DATE DISCREPANCY PREGNANCY, THIRD TRIMESTER: Status: RESOLVED | Noted: 2019-03-07 | Resolved: 2019-03-13

## 2019-03-13 PROBLEM — E07.9 THYROID DISEASE DURING PREGNANCY IN THIRD TRIMESTER: Status: RESOLVED | Noted: 2018-09-11 | Resolved: 2019-03-13

## 2019-03-13 PROBLEM — R10.2 PELVIC PAIN AFFECTING PREGNANCY IN SECOND TRIMESTER, ANTEPARTUM: Status: RESOLVED | Noted: 2018-10-25 | Resolved: 2019-03-13

## 2019-03-13 PROCEDURE — 99024 POSTOP FOLLOW-UP VISIT: CPT | Performed by: OBSTETRICS & GYNECOLOGY

## 2019-03-13 RX ORDER — ACETAMINOPHEN 325 MG/1
650 TABLET ORAL EVERY 6 HOURS PRN
Qty: 30 TABLET | Refills: 0 | Status: SHIPPED | OUTPATIENT
Start: 2019-03-13 | End: 2019-06-13 | Stop reason: ALTCHOICE

## 2019-03-13 RX ORDER — LEVOTHYROXINE SODIUM 0.12 MG/1
250 TABLET ORAL
Status: DISCONTINUED | OUTPATIENT
Start: 2019-03-13 | End: 2019-03-14 | Stop reason: HOSPADM

## 2019-03-13 RX ADMIN — DOCUSATE SODIUM 100 MG: 100 CAPSULE, LIQUID FILLED ORAL at 17:35

## 2019-03-13 RX ADMIN — ACETAMINOPHEN 650 MG: 325 TABLET ORAL at 17:35

## 2019-03-13 RX ADMIN — DOCUSATE SODIUM 100 MG: 100 CAPSULE, LIQUID FILLED ORAL at 10:14

## 2019-03-13 RX ADMIN — ACETAMINOPHEN 650 MG: 325 TABLET ORAL at 04:24

## 2019-03-13 RX ADMIN — LEVOTHYROXINE SODIUM 250 MCG: 125 TABLET ORAL at 06:07

## 2019-03-13 RX ADMIN — ACETAMINOPHEN 650 MG: 325 TABLET ORAL at 10:13

## 2019-03-13 NOTE — PLAN OF CARE
Problem: Potential for Falls  Goal: Patient will remain free of falls  Description  INTERVENTIONS:  - Assess patient frequently for physical needs  -  Identify cognitive and physical deficits and behaviors that affect risk of falls  -  [place] fall precautions as indicated by assessment   - Educate patient/family on patient safety including physical limitations  - Instruct patient to call for assistance with activity based on assessment  - Modify environment to reduce risk of injury  - Consider OT/PT consult to assist with strengthening/mobility  Outcome: Completed     Problem: Knowledge Deficit  Goal: Verbalizes understanding of labor plan  Description  Assess patient/family/caregiver's baseline knowledge level and ability to understand information  Provide education via patient/family/caregiver's preferred learning method at appropriate level of understanding  1  Provide teaching at level of understanding  2  Provide teaching via preferred learning method(s)  Outcome: Completed  Goal: Patient/family/caregiver demonstrates understanding of disease process, treatment plan, medications, and discharge instructions  Description  Complete learning assessment and assess knowledge base    Interventions:  - Provide teaching at level of understanding  - Provide teaching via preferred learning methods  Outcome: Completed     Problem: POSTPARTUM  Goal: Experiences normal postpartum course  Description  INTERVENTIONS:  - Monitor maternal vital signs  - Assess uterine involution and lochia  Outcome: Completed  Goal: Appropriate maternal -  bonding  Description  INTERVENTIONS:  - Identify family support  - Assess for appropriate maternal/infant bonding   -Encourage maternal/infant bonding opportunities  - Referral to  or  as needed  Outcome: Completed  Goal: Establishment of infant feeding pattern  Description  INTERVENTIONS:  - Assess breast/bottle feeding  - Refer to lactation as needed  Outcome: Completed  Goal: Incision(s), wounds(s) or drain site(s) healing without S/S of infection  Description  INTERVENTIONS  - Assess and document risk factors for skin impairment      Outcome: Completed     Problem: PAIN - ADULT  Goal: Verbalizes/displays adequate comfort level or baseline comfort level  Description  Interventions:  - Encourage patient to monitor pain and request assistance  - Assess pain using appropriate pain scale  - Administer analgesics based on type and severity of pain and evaluate response  - Implement non-pharmacological measures as appropriate and evaluate response  - Consider cultural and social influences on pain and pain management  - Notify physician/advanced practitioner if interventions unsuccessful or patient reports new pain  Outcome: Completed     Problem: INFECTION - ADULT  Goal: Absence or prevention of progression during hospitalization  Description  INTERVENTIONS:  - Assess and monitor for signs and symptoms of infection  - Monitor lab/diagnostic results  - Monitor all insertion sites, i e  indwelling lines, tubes, and drains  - Monitor endotracheal (as able) and nasal secretions for changes in amount and color  - [place] appropriate cooling/warming therapies per order  - Administer medications as ordered  - Instruct and encourage patient and family to use good hand hygiene technique  - Identify and instruct in appropriate isolation precautions for identified infection/condition  Outcome: Completed  Goal: Absence of fever/infection during neutropenic period  Description  INTERVENTIONS:  - Monitor WBC  - Implement neutropenic guidelines  Outcome: Completed     Problem: SAFETY ADULT  Goal: Maintain or return to baseline ADL function  Description  INTERVENTIONS:  -  Assess patient's ability to carry out ADLs; assess patient's baseline for ADL function and identify physical deficits which impact ability to perform ADLs (bathing, care of mouth/teeth, toileting, grooming, dressing, etc )  - Assess/evaluate cause of self-care deficits   - Assess range of motion  - Assess patient's mobility; develop plan if impaired  - Assess patient's need for assistive devices and provide as appropriate  - Encourage maximum independence but intervene and supervise when necessary  ¯ Involve family in performance of ADLs  ¯ Assess for home care needs following discharge   ¯ Request OT consult to assist with ADL evaluation and planning for discharge  ¯ Provide patient education as appropriate  Outcome: Completed  Goal: Maintain or return mobility status to optimal level  Description  INTERVENTIONS:  - Assess patient's baseline mobility status (ambulation, transfers, stairs, etc )    - Identify cognitive and physical deficits and behaviors that affect mobility  - Identify mobility aids required to assist with transfers and/or ambulation (gait belt, sit-to-stand, lift, walker, cane, etc )  - [place] fall precautions as indicated by assessment  - Record patient progress and toleration of activity level on Mobility SBAR; progress patient to next Phase/Stage  - Instruct patient to call for assistance with activity based on assessment  - Request Rehabilitation consult to assist with strengthening/weightbearing, etc   Outcome: Completed     Problem: DISCHARGE PLANNING  Goal: Discharge to home or other facility with appropriate resources  Description  INTERVENTIONS:  - Identify barriers to discharge w/patient and caregiver  - Arrange for needed discharge resources and transportation as appropriate  - Identify discharge learning needs (meds, wound care, etc )  - Arrange for interpretive services to assist at discharge as needed  - Refer to Case Management Department for coordinating discharge planning if the patient needs post-hospital services based on physician/advanced practitioner order or complex needs related to functional status, cognitive ability, or social support system  Outcome: Completed

## 2019-03-13 NOTE — UTILIZATION REVIEW
Notification of Maternity Inpatient Admission/Maternity Inpatient Authorization Request  This is a Notification of Maternity Inpatient Admission/Maternity Inpatient Authorization Request to our facility [facility] [address]  Please be advised that this patient is currently in our facility under Inpatient Status  Below you will find the Birth/[place] Summary, Attending Physician and Facilitys information including NPI#  and contact information for the Utilization Review Department where the patient is receiving care services  Facility: [facility] [address]  Address: [address] [place] [address]  Phone: [phone] Tax ID: [id number]  NPI: [id number]  MEDICARE ID: [id number]    Place of Service Code: 24   Place of Service Name: Inpatient Hospital  Presentation Date & Time: 3/12/2019  6:58 AM  Inpatient Admission Date & Time: 3/12/19 5750  Discharge Date & Time: No discharge date for patient encounter  Discharge Disposition (if discharged): Home/Self Care  Attending Physician & NPI: Md Steve Hassan M D    Specialty- Obstetrics and Gynecology    [place] [address] [id number]  [address] [place] [address]  Phone 1: [phone]  Phone 2: [phone]  Fax: [phone]  Mother of [place] Information: Chata Escalante   MRN: [id number] YOB: 1990   Mother's Admitting Diagnosis: Encounter for full-term uncomplicated delivery [B52]  39 weeks gestation of pregnancy [Z3A 39]  Estimated Date of Delivery: 3/16/19  Type of Delivery: Vaginal, Spontaneous     Delivering clinician: Peggy Sanches   OB History        3    Para   2    Term   2            AB   1    Living   2       SAB        TAB        Ectopic        Multiple   0    Live Births   2               [place] Name & MRN:   Information for the patient's :  Jonelle Chonga Duty) [[id number]]      Delivery Information:  Sex: male  Delivered 3/12/2019 4:52 PM by Vaginal, Spontaneous; Gestational Age: 38w3d     Measurements:  Weight: 9 lb 11 oz (4394 g); Height: 21"    APGAR 1 minute 5 minutes 10 minutes   Totals: 9 9      Thank you,  [address] [facility] Review Department  Phone: [phone]; Fax [phone]  ATTENTION: Please call with any questions or concerns to [phone]  and carefully follow the prompts so that you are directed to the right person  Send all requests for admission clinical reviews, approved or denied determinations and any other requests to fax [phone]   All voicemails are confidential

## 2019-03-13 NOTE — UTILIZATION REVIEW
Notification of Inpatient Admission/Inpatient Authorization Request - THIS WAS A FAILED INDUCTION  PENDING REF# [id number]  This is a Notification of Inpatient Admission/Request for Inpatient Authorization for our facility [place] [address]  Be advised that this patient was admitted to our facility under Inpatient Status  Please contact the Utilization Review Department where the patient is receiving care services for additional admission information  Place of Service Code: 24   Place of Service Name: Inpatient Hospital  Presentation Date & Time: 3/11/2019  8:32 PM  Inpatient Admission Date & Time: 3/11/19 2047  Discharge Date & Time: 3/11/2019 10:10 PM   Discharge Disposition (if discharged): Home/Self Care  Attending Physician: Marjorei Chew,  [[id number]]  Admission Orders (From admission, onward)    Ordered        03/11/19 2047  Inpatient Admission  Once     Order ID Start Status   [id number] 03/11/19 2047 Completed              Facility: [address] Utilization Review Department  Phone: [phone]; Fax [phone]  [email or web address]  ATTENTION: Please call with any questions or concerns to [phone]  and carefully listen to the prompts so that you are directed to the right person  Send all requests for admission clinical reviews, approved or denied determinations and any other requests to fax [phone]   All voicemails are confidential

## 2019-03-13 NOTE — UTILIZATION REVIEW
03-11-19   27 YO  G 3 P 1 @ 39 2/7 WKS  ADMITTED FOR CERVICAL RIPENING  CONTINUOUS FETAL MONITORING FHT: 150s bpm, reactive, 15x15 accerlations  [place]: irriatbility     A/P: Patient has decided to go home and return in the morning  Plan for pit and AROM in the morning

## 2019-03-13 NOTE — PLAN OF CARE
Problem: Potential for Falls  Goal: Patient will remain free of falls  Description  INTERVENTIONS:  - Assess patient frequently for physical needs  -  Identify cognitive and physical deficits and behaviors that affect risk of falls  -  [place] fall precautions as indicated by assessment   - Educate patient/family on patient safety including physical limitations  - Instruct patient to call for assistance with activity based on assessment  - Modify environment to reduce risk of injury  - Consider OT/PT consult to assist with strengthening/mobility  Outcome: Progressing     Problem: Knowledge Deficit  Goal: Verbalizes understanding of labor plan  Description  Assess patient/family/caregiver's baseline knowledge level and ability to understand information  Provide education via patient/family/caregiver's preferred learning method at appropriate level of understanding  1  Provide teaching at level of understanding  2  Provide teaching via preferred learning method(s)  Outcome: Progressing  Goal: Patient/family/caregiver demonstrates understanding of disease process, treatment plan, medications, and discharge instructions  Description  Complete learning assessment and assess knowledge base    Interventions:  - Provide teaching at level of understanding  - Provide teaching via preferred learning methods  Outcome: Progressing     Problem: POSTPARTUM  Goal: Experiences normal postpartum course  Description  INTERVENTIONS:  - Monitor maternal vital signs  - Assess uterine involution and lochia  Outcome: Progressing  Goal: Appropriate maternal -  bonding  Description  INTERVENTIONS:  - Identify family support  - Assess for appropriate maternal/infant bonding   -Encourage maternal/infant bonding opportunities  - Referral to  or  as needed  Outcome: Progressing  Goal: Establishment of infant feeding pattern  Description  INTERVENTIONS:  - Assess breast/bottle feeding  - Refer to lactation as needed  Outcome: Progressing  Goal: Incision(s), wounds(s) or drain site(s) healing without S/S of infection  Description  INTERVENTIONS  - Assess and document risk factors for skin impairment      Outcome: Progressing     Problem: PAIN - ADULT  Goal: Verbalizes/displays adequate comfort level or baseline comfort level  Description  Interventions:  - Encourage patient to monitor pain and request assistance  - Assess pain using appropriate pain scale  - Administer analgesics based on type and severity of pain and evaluate response  - Implement non-pharmacological measures as appropriate and evaluate response  - Consider cultural and social influences on pain and pain management  - Notify physician/advanced practitioner if interventions unsuccessful or patient reports new pain  Outcome: Progressing     Problem: INFECTION - ADULT  Goal: Absence or prevention of progression during hospitalization  Description  INTERVENTIONS:  - Assess and monitor for signs and symptoms of infection  - Monitor lab/diagnostic results  - Monitor all insertion sites, i e  indwelling lines, tubes, and drains  - Monitor endotracheal (as able) and nasal secretions for changes in amount and color  - [place] appropriate cooling/warming therapies per order  - Administer medications as ordered  - Instruct and encourage patient and family to use good hand hygiene technique  - Identify and instruct in appropriate isolation precautions for identified infection/condition  Outcome: Progressing  Goal: Absence of fever/infection during neutropenic period  Description  INTERVENTIONS:  - Monitor WBC  - Implement neutropenic guidelines  Outcome: Progressing     Problem: SAFETY ADULT  Goal: Maintain or return to baseline ADL function  Description  INTERVENTIONS:  -  Assess patient's ability to carry out ADLs; assess patient's baseline for ADL function and identify physical deficits which impact ability to perform ADLs (bathing, care of mouth/teeth, toileting, grooming, dressing, etc )  - Assess/evaluate cause of self-care deficits   - Assess range of motion  - Assess patient's mobility; develop plan if impaired  - Assess patient's need for assistive devices and provide as appropriate  - Encourage maximum independence but intervene and supervise when necessary  ¯ Involve family in performance of ADLs  ¯ Assess for home care needs following discharge   ¯ Request OT consult to assist with ADL evaluation and planning for discharge  ¯ Provide patient education as appropriate  Outcome: Progressing  Goal: Maintain or return mobility status to optimal level  Description  INTERVENTIONS:  - Assess patient's baseline mobility status (ambulation, transfers, stairs, etc )    - Identify cognitive and physical deficits and behaviors that affect mobility  - Identify mobility aids required to assist with transfers and/or ambulation (gait belt, sit-to-stand, lift, walker, cane, etc )  - [place] fall precautions as indicated by assessment  - Record patient progress and toleration of activity level on Mobility SBAR; progress patient to next Phase/Stage  - Instruct patient to call for assistance with activity based on assessment  - Request Rehabilitation consult to assist with strengthening/weightbearing, etc   Outcome: Progressing     Problem: DISCHARGE PLANNING  Goal: Discharge to home or other facility with appropriate resources  Description  INTERVENTIONS:  - Identify barriers to discharge w/patient and caregiver  - Arrange for needed discharge resources and transportation as appropriate  - Identify discharge learning needs (meds, wound care, etc )  - Arrange for interpretive services to assist at discharge as needed  - Refer to Case Management Department for coordinating discharge planning if the patient needs post-hospital services based on physician/advanced practitioner order or complex needs related to functional status, cognitive ability, or social support system  Outcome: Progressing

## 2019-03-13 NOTE — UTILIZATION REVIEW
Notification of Maternity Inpatient Admission/Maternity Inpatient Authorization Request  This is a Notification of Maternity Inpatient Admission/Maternity Inpatient Authorization Request to our facility [address] [place] [address]  Please be advised that this patient is currently in our facility under Inpatient Status  Below you will find the Birth/ Summary, Attending Physician and Facilitys information including NPI#  and contact information for the Utilization Review Department where the patient is receiving care services  Facility: [address] [place] [address]  Address: [address] [place] [address]  Phone: [phone] Tax ID: [id number]  NPI: [id number]  MEDICARE ID: [id number]    Place of Service Code: 24   Place of Service Name: Inpatient Hospital  Presentation Date & Time: 3/11/2019  8:32 PM  Inpatient Admission Date & Time: 3/11/19 2047  Discharge Date & Time: 3/11/2019 10:10 PM   Discharge Disposition (if discharged): Home/Self Care  Attending Physician & NPI: Yesi Nava Do [[id number]]  SUSY James    Specialty- Obstetrics and Gynecology  [facility] ID- [id number]  [address] [place] [address]  Phone 1: [phone]  Fax: [phone]  Mother of  Information: Chana Best   MRN: [id number] YOB: 1990   Mother's Admitting Diagnosis: Encounter for full-term uncomplicated delivery [Y81]  44 weeks gestation of pregnancy [Z3A 39]  Estimated Date of Delivery: 3/16/19  Type of Delivery: Vaginal, Spontaneous    Delivering clinician: Mayank Jiménez   OB History        3    Para   2    Term   2            AB   1    Living   2       SAB        TAB        Ectopic        Multiple   0    Live Births   2                Name & MRN:   Information for the patient's :  Juan More [[id number]]     [place] Delivery Information:  Sex: male  Delivered 3/12/2019 4:52 PM by Vaginal, Spontaneous; Gestational Age: 38w3d     Measurements:  Weight: 9 lb 11 oz (4394 g); Height: 21"    APGAR 1 minute 5 minutes 10 minutes   Totals: 9 9      Thank you,  [address]  Utilization Review Department  Phone: [phone]; Fax [phone]  ATTENTION: Please call with any questions or concerns to [phone]  and carefully follow the prompts so that you are directed to the right person  Send all requests for admission clinical reviews, approved or denied determinations and any other requests to fax [phone]   All voicemails are confidential

## 2019-03-13 NOTE — PROGRESS NOTES
Progress Note - OB/GYN   Maria Fernanda Fernandez 33 y o  female MRN: [id number]  Unit/Bed#: -01 Encounter: [id number]    Assessment:  Post partum Day #1 s/p , stable, baby in room    Plan:  1) Continue routine post partum care   Encourage ambulation   Encourage breastfeeding   Anticipate discharge tomorrow     Subjective/Objective   Chief Complaint:     Post delivery  Patient is doing well  Lochia WNL  Pain well controlled  Patient only complaint is tiredness  Subjective:     Pain: yes, cramping, improved with meds  Tolerating PO: yes  Voiding: yes  Flatus: no  BM: no  Ambulating: yes  Breastfeeding:  yes  Chest pain: no  Shortness of breath: no  Leg pain: no  Lochia: minimal    Objective:     Vitals: /61   Pulse 60   Temp 97 6 °F (36 4 °C) (Oral)   Resp 18   Ht 5' 6" (1 676 m)   Wt 105 kg (232 lb)   LMP  (LMP Unknown)   SpO2 97%   Breastfeeding? Yes   BMI 37 45 kg/m²       Intake/Output Summary (Last 24 hours) at 3/13/2019 0619  Last data filed at 3/13/2019 0015  Gross per 24 hour   Intake 999 ml   Output 1039 ml   Net -40 ml       Lab Results   Component Value Date    WBC 9 29 2019    HGB 10 7 (L) 2019    HCT 34 1 (L) 2019    MCV 81 (L) 2019     2019       Physical Exam:     Gen: AAOx3, NAD  CV: RRR  Lungs: CTA b/l  Abd: Soft, non-tender, non-distended, no rebound or guarding  Uterine fundus firm and non-tender, 2 cm below the umbilicus     Ext: Non tender    Sung Harvey MD  3/13/2019  6:19 AM

## 2019-03-14 VITALS
DIASTOLIC BLOOD PRESSURE: 64 MMHG | TEMPERATURE: 98.5 F | WEIGHT: 232 LBS | RESPIRATION RATE: 18 BRPM | OXYGEN SATURATION: 97 % | HEIGHT: 66 IN | BODY MASS INDEX: 37.28 KG/M2 | SYSTOLIC BLOOD PRESSURE: 121 MMHG | HEART RATE: 78 BPM

## 2019-03-14 PROCEDURE — 99024 POSTOP FOLLOW-UP VISIT: CPT | Performed by: OBSTETRICS & GYNECOLOGY

## 2019-03-14 RX ORDER — DOCUSATE SODIUM 100 MG/1
100 CAPSULE, LIQUID FILLED ORAL 2 TIMES DAILY
Qty: 10 CAPSULE | Refills: 0
Start: 2019-03-14 | End: 2019-06-13 | Stop reason: ALTCHOICE

## 2019-03-14 RX ADMIN — DOCUSATE SODIUM 100 MG: 100 CAPSULE, LIQUID FILLED ORAL at 08:32

## 2019-03-14 RX ADMIN — LEVOTHYROXINE SODIUM 250 MCG: 125 TABLET ORAL at 05:55

## 2019-03-14 RX ADMIN — ACETAMINOPHEN 650 MG: 325 TABLET ORAL at 08:32

## 2019-03-14 NOTE — PROGRESS NOTES
Progress Note - OB/GYN   Nilesh Christianson 33 y o  female MRN: [id number]  Unit/Bed#: -01 Encounter: [id number]    Assessment:  Post partum Day #2 s/p , stable, baby in room    Plan:  1) Continue routine post partum care   Encourage ambulation   Encourage breastfeeding   Anticipate discharge today     Subjective/Objective   Chief Complaint:     Post delivery  Patient is doing well  Lochia WNL  Pain well controlled  Stayed for  related care  Subjective:     Pain: yes, cramping, improved with meds  Tolerating PO: yes  Voiding: yes  Flatus: yes  BM: no  Ambulating: yes  Breastfeeding:  yes  Chest pain: no  Shortness of breath: no  Leg pain: no  Lochia: minimal    Objective:     Vitals: /66 (BP Location: Right arm)   Pulse 74   Temp 97 5 °F (36 4 °C) (Oral)   Resp 18   Ht 5' 6" (1 676 m)   Wt 105 kg (232 lb)   LMP  (LMP Unknown)   SpO2 97%   Breastfeeding? Yes Comment: pt is agreeable to pumping and states that she would perfer   BMI 37 45 kg/m²     No intake or output data in the 24 hours ending 19 0627    Lab Results   Component Value Date    WBC 9 29 2019    HGB 10 7 (L) 2019    HCT 34 1 (L) 2019    MCV 81 (L) 2019     2019       Physical Exam:     Gen: AAOx3, NAD  CV: RRR  Lungs: CTA b/l  Abd: Soft, non-tender, non-distended, no rebound or guarding  Uterine fundus firm and non-tender, 2 cm below the umbilicus     Ext: Non tender    Brionna Cobb MD  3/14/2019  6:27 AM

## 2019-03-25 LAB — PLACENTA IN STORAGE: NORMAL

## 2019-04-03 ENCOUNTER — TELEPHONE (OUTPATIENT)
Dept: OBGYN CLINIC | Facility: CLINIC | Age: 29
End: 2019-04-03

## 2019-04-04 ENCOUNTER — POSTPARTUM VISIT (OUTPATIENT)
Dept: OBGYN CLINIC | Facility: MEDICAL CENTER | Age: 29
End: 2019-04-04

## 2019-04-04 VITALS — SYSTOLIC BLOOD PRESSURE: 114 MMHG | DIASTOLIC BLOOD PRESSURE: 64 MMHG | BODY MASS INDEX: 32.12 KG/M2 | WEIGHT: 199 LBS

## 2019-04-04 DIAGNOSIS — E89.0 HYPOTHYROIDISM, POSTSURGICAL: Primary | ICD-10-CM

## 2019-04-04 PROBLEM — Z3A.39 39 WEEKS GESTATION OF PREGNANCY: Status: RESOLVED | Noted: 2019-03-11 | Resolved: 2019-04-04

## 2019-04-04 PROCEDURE — 99024 POSTOP FOLLOW-UP VISIT: CPT | Performed by: PHYSICIAN ASSISTANT

## 2019-04-05 ENCOUNTER — TELEPHONE (OUTPATIENT)
Dept: OBGYN CLINIC | Facility: MEDICAL CENTER | Age: 29
End: 2019-04-05

## 2019-04-24 ENCOUNTER — TELEPHONE (OUTPATIENT)
Dept: OBGYN CLINIC | Facility: MEDICAL CENTER | Age: 29
End: 2019-04-24

## 2019-05-14 ENCOUNTER — OFFICE VISIT (OUTPATIENT)
Dept: OBGYN CLINIC | Facility: MEDICAL CENTER | Age: 29
End: 2019-05-14
Payer: COMMERCIAL

## 2019-05-14 ENCOUNTER — TELEPHONE (OUTPATIENT)
Dept: OBGYN CLINIC | Facility: MEDICAL CENTER | Age: 29
End: 2019-05-14

## 2019-05-14 VITALS — BODY MASS INDEX: 32.12 KG/M2 | SYSTOLIC BLOOD PRESSURE: 112 MMHG | WEIGHT: 199 LBS | DIASTOLIC BLOOD PRESSURE: 64 MMHG

## 2019-05-14 DIAGNOSIS — Z30.430 ENCOUNTER FOR INSERTION OF PARAGARD IUD: Primary | ICD-10-CM

## 2019-05-14 LAB — SL AMB POCT URINE HCG: NORMAL

## 2019-05-14 PROCEDURE — 81025 URINE PREGNANCY TEST: CPT | Performed by: PHYSICIAN ASSISTANT

## 2019-05-14 PROCEDURE — 58300 INSERT INTRAUTERINE DEVICE: CPT | Performed by: PHYSICIAN ASSISTANT

## 2019-05-14 RX ORDER — COPPER 313.4 MG/1
1 INTRAUTERINE DEVICE INTRAUTERINE ONCE
Status: COMPLETED | OUTPATIENT
Start: 2019-05-14 | End: 2019-05-14

## 2019-05-14 RX ADMIN — COPPER 1 INTRA UTERINE DEVICE: 313.4 INTRAUTERINE DEVICE INTRAUTERINE at 09:34

## 2019-06-13 ENCOUNTER — OFFICE VISIT (OUTPATIENT)
Dept: OBGYN CLINIC | Facility: MEDICAL CENTER | Age: 29
End: 2019-06-13
Payer: COMMERCIAL

## 2019-06-13 VITALS — SYSTOLIC BLOOD PRESSURE: 118 MMHG | BODY MASS INDEX: 31.8 KG/M2 | DIASTOLIC BLOOD PRESSURE: 74 MMHG | WEIGHT: 197 LBS

## 2019-06-13 DIAGNOSIS — Z30.431 INTRAUTERINE DEVICE SURVEILLANCE: Primary | ICD-10-CM

## 2019-06-13 PROCEDURE — 99212 OFFICE O/P EST SF 10 MIN: CPT | Performed by: PHYSICIAN ASSISTANT

## 2019-06-13 RX ORDER — LEVOTHYROXINE SODIUM 0.15 MG/1
150 TABLET ORAL DAILY
COMMUNITY

## 2021-04-13 ENCOUNTER — TELEPHONE (OUTPATIENT)
Dept: OBGYN CLINIC | Facility: CLINIC | Age: 31
End: 2021-04-13

## 2021-04-13 DIAGNOSIS — T83.31XA IUD FAILURE, PREGNANT: Primary | ICD-10-CM

## 2021-04-13 DIAGNOSIS — Z33.1 IUD FAILURE, PREGNANT: Primary | ICD-10-CM

## 2021-04-13 NOTE — TELEPHONE ENCOUNTER
Incoming call from patient  Positive pregnancy test  Has had Paragard for 2 years  Was seen for 3 month follow up string check  Noticed hasnt had menses for 2 months and reports sciatic pain and pelvic cramping  Denies vaginal bleeding  Does not normally check for strings so not sure if they are present  Thinks last menses for 2-3 months ago  Spoke with Dr Johnny Alva  Advises outpatient US stat  Patient aware, order placed,will call to schedule

## 2021-04-13 NOTE — TELEPHONE ENCOUNTER
Followed up with patient to see what results were from [address] that was ordered STAT today  Noticed no appt was scheduled  Patient claims she called Central Scheduling and was told there were not appointments available today  There is no documentation of this and we have not received a call from central scheduling  Patient states she is at her gyn in [place] and they have already done an [address] and attempted to removed the IUD without success  States she is going to continue her care there  Will make on call provider aware  Follow up call to Central Scheduling, they have no documentation of the call from this patient and that they would have reached out to us if they could not find her an appointment today

## 2021-05-12 ENCOUNTER — OFFICE VISIT (OUTPATIENT)
Dept: OBGYN CLINIC | Facility: CLINIC | Age: 31
End: 2021-05-12
Payer: COMMERCIAL

## 2021-05-12 VITALS
HEIGHT: 67 IN | DIASTOLIC BLOOD PRESSURE: 86 MMHG | BODY MASS INDEX: 35.03 KG/M2 | WEIGHT: 223.2 LBS | SYSTOLIC BLOOD PRESSURE: 124 MMHG

## 2021-05-12 DIAGNOSIS — Z30.014 ENCOUNTER FOR INITIAL PRESCRIPTION OF INTRAUTERINE CONTRACEPTIVE DEVICE (IUD): Primary | ICD-10-CM

## 2021-05-12 PROCEDURE — 99213 OFFICE O/P EST LOW 20 MIN: CPT | Performed by: NURSE PRACTITIONER

## 2021-05-12 NOTE — PATIENT INSTRUCTIONS
Intrauterine Device   WHAT YOU NEED TO KNOW:   What is an intrauterine device (IUD)? An IUD is a type of birth control that is inserted into your uterus  It is a small, flexible piece of plastic with a string on the end  It is inserted and removed by your healthcare provider  IUDs prevent sperm from reaching or fertilizing an egg  IUDs also prevent a fertilized egg from attaching to the uterus and developing into a fetus  What are the most common types of IUDs? Your healthcare provider will recommend the type of IUD that is right for you  This is based on your age and if you have had a child  If you have not had a child, a smaller IUD will be used  · A copper IUD  slowly releases a small amount of copper into your uterus  This IUD can remain in place for up to 10 years  · A hormone-releasing IUD  slowly releases a small amount of progesterone into your uterus  Progesterone is a hormone that is made by your body to help control your periods  This IUD can remain in place for 3 to 5 years  What are the advantages of an IUD? · An IUD is 98% to 99% effective in preventing pregnancy  · The IUD can be removed by your healthcare provider if you decide to have a baby  You may be able to get pregnant as soon as the IUD is removed  · An IUD protects you from pregnancy right after it is inserted  · You do not have to stop sexual activity to insert it  You do not have to remember to take your birth control pill  · Copper IUDs are safer for some women than oral birth control pills  Examples include women who smoke or have a history of blood clots  · Hormone-releasing IUDs may decrease certain health problems  Examples include bleeding and cramping that happen with your monthly period  What are the disadvantages of an IUD? · There is a small chance that you could get pregnant  Sometimes the IUD cannot be removed after you get pregnant   This increases your risk of a miscarriage or an ectopic pregnancy  Ectopic pregnancy is when the fertilized egg starts to grow somewhere other than your uterus  · An IUD does not protect you from sexually transmitted infections  · You may have cramps during the first weeks after you get the IUD  · A copper IUD may cause your monthly period to be heavier or more painful  This is more common within the first 3 months after you get the IUD  You may need to have your IUD removed if your bleeding or pain becomes severe  You may have spotting between periods  · There is a small risk of an infection within the first 20 days after the IUD is placed  Infection can lead to pelvic inflammatory disease  This can cause infertility  · Your uterus may tear when the IUD is inserted  The IUD may slip part or all of the way out of your uterus  How is the IUD inserted? · The IUD is usually inserted during your monthly period  This may help decrease the amount of discomfort you have during the procedure  It also helps make sure that you are not pregnant  You will be asked to lie down and place your feet in stirrups  Your healthcare provider will gently insert a speculum into your vagina  This is the same tool used during a Pap smear  The speculum allows your healthcare provider to see inside your vagina to your cervix  The cervix is the opening of your uterus  · Your healthcare provider will clean your cervix with an antiseptic solution to prevent infection  You may be given numbing medicine  A long plastic tube is gently passed through your cervix and into your uterus  This tube has the IUD inside of it  The IUD is pushed out of the tube and into your uterus  You may have cramps as the IUD is inserted  The tube is removed after the IUD is in place  How can I make sure my IUD is still in place? An IUD has a string made of plastic thread  One to 2 inches of this string hangs into your vagina   You cannot see this string, and it should not cause problems when you have sex  Check your IUD string every 3 days for the first 3 months after it is inserted  After that, check the string after each monthly period  Do the following to check the placement of your IUD:  · Wash your hands with soap and warm water  Dry them with a clean towel  · Bend your knees and squat low to the ground  · Gently put your index finger inside your vagina  The cervix is at the top of the vagina and feels like the tip of your nose  Feel for the IUD string  Do not pull on the string  You should not be able to feel the firm plastic of the IUD itself  · Wash your hands after you check your IUD string  Where can I find more information? · [facility] [address] [place]  Phone: 5- 706 - [phone]  Web Address: https://[employer]    When should I seek immediate care? · You have severe pain or bleeding during your period  · You have a fever and severe abdominal pain  When should I call my doctor? · You think you are pregnant  · The IUD has come out  · You have bleeding from your vagina after you have sex, and it is not your period  · You have pain during sex  · You cannot feel the IUD string, the string feels longer, or you feel the plastic of the IUD itself  · You have vaginal discharge that is green, yellow, or has a foul odor  · You have questions or concerns about your condition or care  CARE AGREEMENT:   You have the right to help plan your care  Learn about your health condition and how it may be treated  Discuss treatment options with your healthcare providers to decide what care you want to receive  You always have the right to refuse treatment  The above information is an  only  It is not intended as medical advice for individual conditions or treatments  Talk to your doctor, nurse or pharmacist before following any medical regimen to see if it is safe and effective for you    © [address] [place] [address] 2021 Information is for End User's use only and may not be sold, redistributed or otherwise used for commercial purposes   All illustrations and images included in CareNotes® are the copyrighted property of A D A M , Inc  or [address] [place], [place] [address]jay vignesh

## 2021-05-12 NOTE — PROGRESS NOTES
Diagnoses and all orders for this visit:    Encounter for initial prescription of intrauterine contraceptive device (IUD)    Reviewed plan of insertion:  Will schedule insertion appt today  Patient instructed to premedicate 1 hour before appt with 600 mg of ibuprofen  Will then need to return in 6 weeks after insertion for a string check  UPT on day of insertion  All questions and concerns answered  Patient to call with any questions  Pleasant 35 y o  premenopausal female here for birth control consult  She reports that her periods are heavy  and reports irregular cycles with Paragard IUD  She had a recent +pregnancy and then EAB, also needed her Paragard removed and states that she does not want another Paragard IUD inserted  She is interested in discussing birth control options which include:  Chery Engle and [facility] IUDs and pt will go with Angelita  , hx of vaginal deliveries, last one 2 years ago  Sexually active- but has not been active since recent EAB  Pt declines STD testing  She denies smoking, cardiovascular disease, DVT/PE hx, known blood disorders and migraines with aura  Denies F/C/N/V      Past Medical History:   Diagnosis Date    Anemia     hx of    Hypothyroidism affecting pregnancy 2017    Ovarian cyst     age 13    Status post vacuum-assisted vaginal delivery 2017    Thyroid cancer ([facility] [address] )     thyroid CA- throidectomy 2017    Urinary tract infection     hx of uti's    Varicella     childhood chickenpox     Past Surgical History:   Procedure Laterality Date    THYROIDECTOMY  2017    VAGINAL DELIVERY  2017    WISDOM TOOTH EXTRACTION       Family History   Problem Relation Age of Onset    Cancer Mother     Stroke Father     Heart disease Father         defect    Cancer Paternal Grandfather         throat     Social History     Tobacco Use    Smoking status: Former Smoker     Quit date: 2016     Years since quittin 3    Smokeless tobacco: Never Used    Tobacco comment: quit 2016   Substance Use Topics    Alcohol use: No    Drug use: Not Currently     Types: Marijuana     Comment: last  used 10 yrs ago  Current Outpatient Medications:     levothyroxine 150 mcg tablet, Take 150 mcg by mouth daily, Disp: , Rfl:   Patient Active Problem List    Diagnosis Date Noted    Encounter for initial prescription of intrauterine contraceptive device (IUD) 2021    Intrauterine device surveillance 2019    Encounter for insertion of ParaGard IUD 2019    Hypothyroidism, postsurgical 2018       Allergies   Allergen Reactions    Aspirin Anaphylaxis and Angioedema       OB History    Para Term  AB Living   4 2 2 0 2 2   SAB TAB Ectopic Multiple Live Births   0 0 0 0 2      # Outcome Date GA Lbr Corey/2nd Weight Sex Delivery Anes PTL Lv   4 AB 2021           3 Term 19 39w3d / 00:07 4394 g (9 lb 11 oz) M Vag-Spont EPI N AMY   2 Term 17 40w5d  4139 g (9 lb 2 oz) M Vag-Vacuum EPI  AMY   1 AB  5w0d              Vitals:    21 1055   BP: 124/86   Weight: 101 kg (223 lb 3 2 oz)   Height: 5' 6 5" (1 689 m)     Body mass index is 35 49 kg/m²  Review of Systems   Constitutional: Negative for chills, fatigue, fever and unexpected weight change  Respiratory: Negative for shortness of breath  Gastrointestinal: Negative for anal bleeding, blood in stool, constipation and diarrhea  Genitourinary: Negative for difficulty urinating, dysuria and hematuria  OBGyn Exam    Physical Exam   Constitutional: She appears well-developed and well-nourished  No distress  Head: Normocephalic  Neck: Normal range of motion  Neck supple  Cardiac: S1& S2 sounds presents, Regular rate and rhythm  Pulmonary: Effort normal  Lungs sound clear  Abdominal: Soft   Non-tender  Pelvic exam was Deferred

## 2021-05-19 NOTE — PATIENT INSTRUCTIONS
Intrauterine Device   DISCHARGE INSTRUCTIONS:   Seek care immediately if:   · You have severe pain or bleeding during your period  · You have a fever and severe abdominal pain  Call your doctor or gynecologist if:   · You think you are pregnant  · The IUD has come out  · You have bleeding from your vagina after you have sex, and it is not your period  · You have pain during sex  · You cannot feel the IUD string, the string feels longer, or you feel the plastic of the IUD itself  · You have vaginal discharge that is green, yellow, or has a foul odor  · You have questions or concerns about your condition or care  Medicines:   · NSAIDs  help decrease swelling and pain or fever  This medicine is available with or without a doctor's order  NSAIDs can cause stomach bleeding or kidney problems in certain people  If you take blood thinner medicine, always ask your healthcare provider if NSAIDs are safe for you  Always read the medicine label and follow directions  · Take your medicine as directed  Contact your healthcare provider if you think your medicine is not helping or if you have side effects  Tell him or her if you are allergic to any medicine  Keep a list of the medicines, vitamins, and herbs you take  Include the amounts, and when and why you take them  Bring the list or the pill bottles to follow-up visits  Carry your medicine list with you in case of an emergency  Self-care:   · Apply heat to relieve pain and cramping  Use a heating pad set on low  Apply heat to your lower abdomen for 20 minutes every hour, or as directed  · Return to activities as directed  Your healthcare provider will tell you when it is okay to return to work, school, or other activities  · Do not use a tampon or have sex  until your provider says it is okay  Make sure your IUD is in place: An IUD has a string that is made of plastic thread  One to 2 inches of this string hangs into your vagina  You cannot see this string, and it should not cause problems when you have sex  Check your IUD string every 3 days for the first 3 months that you have your IUD  After that, check the string after each monthly period  Do the following to check the placement of your IUD:  · Wash your hands with soap and warm water  Dry them with a clean towel  · Bend your knees and squat low to the ground  · Gently put your index finger inside your vagina  The cervix is at the top of the vagina and feels like the tip of your nose  Feel for the IUD string  Do not pull on the string  You should not be able to feel the firm plastic of the IUD itself  · Wash your hands after you check your IUD string  For more information:   · [facility] [address] , [address] [place]  Phone: 2- 326 - [phone]  Web Address: [email or web address]    Follow up with your healthcare provider as directed:  Write down your questions so you remember to ask them during your visits  © Copyright [employer] 2021 Information is for End User's use only and may not be sold, redistributed or otherwise used for commercial purposes  All illustrations and images included in CareNotes® are the copyrighted property of A D A M , Inc  or [facility] Liza De La Fuente   The above information is an  only  It is not intended as medical advice for individual conditions or treatments  Talk to your doctor, nurse or pharmacist before following any medical regimen to see if it is safe and effective for you

## 2021-05-20 ENCOUNTER — PROCEDURE VISIT (OUTPATIENT)
Dept: OBGYN CLINIC | Facility: CLINIC | Age: 31
End: 2021-05-20
Payer: COMMERCIAL

## 2021-05-20 VITALS
SYSTOLIC BLOOD PRESSURE: 120 MMHG | DIASTOLIC BLOOD PRESSURE: 84 MMHG | WEIGHT: 226 LBS | HEIGHT: 67 IN | BODY MASS INDEX: 35.47 KG/M2

## 2021-05-20 DIAGNOSIS — Z30.430 VISIT FOR INSERTION OF INTRAUTERINE DEVICE: Primary | ICD-10-CM

## 2021-05-20 DIAGNOSIS — Z32.02 NEGATIVE PREGNANCY TEST: ICD-10-CM

## 2021-05-20 LAB — SL AMB POCT URINE HCG: NEGATIVE

## 2021-05-20 PROCEDURE — 81025 URINE PREGNANCY TEST: CPT | Performed by: NURSE PRACTITIONER

## 2021-05-20 PROCEDURE — 58300 INSERT INTRAUTERINE DEVICE: CPT | Performed by: NURSE PRACTITIONER

## 2021-05-20 NOTE — PROGRESS NOTES
Iud insertions    Date/Time: 5/20/2021 12:41 PM  Performed by: MARNI Dang  Authorized by: MARNI Dang   Universal Protocol:  Consent: Written consent obtained  Risks and benefits: risks, benefits and alternatives were discussed  Consent given by: patient  Patient understanding: patient states understanding of the procedure being performed  Patient consent: the patient's understanding of the procedure matches consent given  Procedure consent: procedure consent matches procedure scheduled  Relevant documents: relevant documents present and verified  Required items: required blood products, implants, devices, and special equipment available  Patient identity confirmed: verbally with patient        Procedure:     Pelvic exam performed: yes      Negative urine pregnancy test: yes      Cervix cleaned and prepped: yes      Speculum placed in vagina: yes      Tenaculum applied to cervix: yes      Uterus sounded: yes      Uterus sound depth (cm):  7 5    IUD inserted with no complications: yes      IUD type:  Mirena    Strings trimmed: yes    Comments:      Pleasant 35 y o female presents for Mirena IUD insertion  She had recent EAB with paragard IUD in place  No menses since termination, however, pt wants contraception in place ASAP  If periods occur, they may be irregular for a few months  She will RTO in 6 weeks for a string check  Scant amount of bleeding noted from cervical os, small amount  from tenaculum site, pressure applied, bleeding stopped  1 stick of silver nitrate used at tenaculum site  Reviewed with pt normal to have cramping for a few days and can take tylenol  as needed, but she should report and pelvic pain  All questions and concerns answered  Call with any problems

## 2021-07-01 NOTE — PROGRESS NOTES
Assessment/Plan:    No problem-specific Assessment & Plan notes found for this encounter  Diagnoses and all orders for this visit:    IUD check up  Reviewed with pt to call office for any heavy vaginal bleeding or pelvic pain  All questions and concerns answered  Call with any problems  Other orders  -     levothyroxine 200 mcg tablet; Take 200 mcg by mouth daily          Subjective:      Patient ID: Candelario Lefort is a 35 y o  female  Pleasant 35 y o female presents for string Check  Had Mirena IUD placed on 5/20/21  She reported having only a 2 week cycle after insertion, but no concerns since then  So far she is happy with this option  She denies and urinary or breast problems today  The following portions of the patient's history were reviewed and updated as appropriate: allergies, current medications, past family history, past medical history, past social history, past surgical history and problem list     Review of Systems   Constitutional: Negative for chills, fatigue, fever and unexpected weight change  Cardiovascular: Negative for chest pain and leg swelling  Gastrointestinal: Negative for constipation, diarrhea, nausea and vomiting  Genitourinary: Negative for dysuria, frequency, menstrual problem, pelvic pain, vaginal bleeding and vaginal discharge  Neurological: Negative for headaches  Objective: There were no vitals taken for this visit  Physical Exam  Vitals and nursing note reviewed  Constitutional:       Appearance: She is well-developed  Cardiovascular:      Rate and Rhythm: Normal rate and regular rhythm  Pulmonary:      Effort: Pulmonary effort is normal       Breath sounds: Normal breath sounds  Abdominal:      Palpations: Abdomen is soft  Genitourinary:     General: Normal vulva  Exam position: Lithotomy position        Vagina: Normal       Cervix: Normal       Uterus: Normal        Adnexa: Right adnexa normal and left adnexa normal       Comments: IUD STRINGS VISUALIZED  Skin:     General: Skin is warm and dry  Neurological:      Mental Status: She is alert and oriented to person, place, and time     Psychiatric:         Behavior: Behavior normal

## 2021-07-01 NOTE — PATIENT INSTRUCTIONS
Intrauterine Device   DISCHARGE INSTRUCTIONS:   Seek care immediately if:   · You have severe pain or bleeding during your period  · You have a fever and severe abdominal pain  Call your doctor or gynecologist if:   · You think you are pregnant  · The IUD has come out  · You have bleeding from your vagina after you have sex, and it is not your period  · You have pain during sex  · You cannot feel the IUD string, the string feels longer, or you feel the plastic of the IUD itself  · You have vaginal discharge that is green, yellow, or has a foul odor  · You have questions or concerns about your condition or care  Medicines:   · NSAIDs  help decrease swelling and pain or fever  This medicine is available with or without a doctor's order  NSAIDs can cause stomach bleeding or kidney problems in certain people  If you take blood thinner medicine, always ask your healthcare provider if NSAIDs are safe for you  Always read the medicine label and follow directions  · Take your medicine as directed  Contact your healthcare provider if you think your medicine is not helping or if you have side effects  Tell him or her if you are allergic to any medicine  Keep a list of the medicines, vitamins, and herbs you take  Include the amounts, and when and why you take them  Bring the list or the pill bottles to follow-up visits  Carry your medicine list with you in case of an emergency  Self-care:   · Apply heat to relieve pain and cramping  Use a heating pad set on low  Apply heat to your lower abdomen for 20 minutes every hour, or as directed  · Return to activities as directed  Your healthcare provider will tell you when it is okay to return to work, school, or other activities  · Do not use a tampon or have sex  until your provider says it is okay  Make sure your IUD is in place: An IUD has a string that is made of plastic thread  One to 2 inches of this string hangs into your vagina  You cannot see this string, and it should not cause problems when you have sex  Check your IUD string every 3 days for the first 3 months that you have your IUD  After that, check the string after each monthly period  Do the following to check the placement of your IUD:  · Wash your hands with soap and warm water  Dry them with a clean towel  · Bend your knees and squat low to the ground  · Gently put your index finger inside your vagina  The cervix is at the top of the vagina and feels like the tip of your nose  Feel for the IUD string  Do not pull on the string  You should not be able to feel the firm plastic of the IUD itself  · Wash your hands after you check your IUD string  For more information:   · [facility] [address] [place]  Phone: 6- [phone] - [phone]  Web Address: https://[employer]    Follow up with your healthcare provider as directed:  Write down your questions so you remember to ask them during your visits  © Copyright [employer] 2021 Information is for End User's use only and may not be sold, redistributed or otherwise used for commercial purposes  All illustrations and images included in CareNotes® are the copyrighted property of A D A M , Inc  or [facility] Liza De La Fuente   The above information is an  only  It is not intended as medical advice for individual conditions or treatments  Talk to your doctor, nurse or pharmacist before following any medical regimen to see if it is safe and effective for you

## 2021-07-02 ENCOUNTER — OFFICE VISIT (OUTPATIENT)
Dept: OBGYN CLINIC | Facility: CLINIC | Age: 31
End: 2021-07-02
Payer: COMMERCIAL

## 2021-07-02 VITALS
WEIGHT: 224.2 LBS | HEIGHT: 67 IN | DIASTOLIC BLOOD PRESSURE: 84 MMHG | SYSTOLIC BLOOD PRESSURE: 116 MMHG | BODY MASS INDEX: 35.19 KG/M2

## 2021-07-02 DIAGNOSIS — Z30.431 IUD CHECK UP: Primary | ICD-10-CM

## 2021-07-02 PROCEDURE — 99212 OFFICE O/P EST SF 10 MIN: CPT | Performed by: NURSE PRACTITIONER

## 2021-07-02 RX ORDER — LEVOTHYROXINE SODIUM 0.2 MG/1
200 TABLET ORAL DAILY
COMMUNITY
Start: 2021-06-21

## 2022-11-01 ENCOUNTER — OFFICE VISIT (OUTPATIENT)
Dept: ENDOCRINOLOGY | Facility: HOSPITAL | Age: 32
End: 2022-11-01

## 2022-11-01 VITALS
WEIGHT: 216.4 LBS | HEART RATE: 72 BPM | BODY MASS INDEX: 33.97 KG/M2 | HEIGHT: 67 IN | DIASTOLIC BLOOD PRESSURE: 76 MMHG | SYSTOLIC BLOOD PRESSURE: 122 MMHG

## 2022-11-01 DIAGNOSIS — E89.0 HYPOTHYROIDISM, POSTSURGICAL: Primary | ICD-10-CM

## 2022-11-01 RX ORDER — LEVOTHYROXINE SODIUM 0.15 MG/1
150 TABLET ORAL DAILY
Qty: 100 TABLET | Refills: 1 | Status: SHIPPED | OUTPATIENT
Start: 2022-11-01 | End: 2022-11-10

## 2022-11-01 NOTE — PROGRESS NOTES
Chief Complaint   Patient presents with   • Hypothyroidism      Referring Provider  Ishmael Sousa  [id number] [facility] [address]     History of Present Illness:   Vera Fisher is a 33 y o  female with post surgical hypothyroidism with history of prior hashimoto's who presents today for management of her ongoing hypothyroidism  Patient did not bring her reports for pathology of thyroid cancer therefore unable to review history on this  Brief note:- was diagnosed with having multiple thyroid nodules during pregnancy with her first son in 2017, had FNA done which showed ?pre cancer cells and therefore underwent total thyroidectomy which did show positive PTC  Unknown cell variant, extension and features  She was on levothyroxine prior to surgery d/t hashimoto but dose increased since  Patient then had her second son 3 years ago and reports thyroid labs were labile then as well  But generally had been on steady dose of 150-175mcg for years  She then stopped taking her levothyroxine (for 3 months) last last year and followed up with endocrine in [place] who obtained repeat labs and found to have high TSH? Therefore increased her dose to 225mcg  Also checked US and Tg levels and reportedly was normal  Endocrinologist left for sabbatical after  Patient began to experience significant weight loss, fatigue and overall feeling unwell with follow up TSH being suppressed (TSH <0 009, FreeT4 2 5)  Dose was reduced back down to 150mcg since  Patient remains on this dose since and has been tolerating it ok  Does report has gained back the weight she lost when on high dose levothyroxine but currently at her steady state weight, does report fatigue but also has a 15year old kids at home  Has mirena IUD therefore does not get menses  Patient denies any dysphagia, odynophagia, hair loss, dry skin, brittle nails  She does struggle with constipation since last week despite no diet, medication or fluid intake changes  Compliant with her levothyroxine 150mcg takes in morning empty stomach and waits >1hr before any food  No supplementations  Patient periodically takes calcium supplements for hand numbness/tingling  She did develop perioral numbness post op after her thyroidectomy which self resolved but was told to take calcium if has neurological numbness/tingling symptoms  thyroid US this summer- this summer  Fhx:   thyroid disorder- negative for thyroid cancer or any other endocrine illness  Social Hx:- denies smoking, alcohol use or any other recreational drugs  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   [facility] origin    Patient Active Problem List   Diagnosis   • Hypothyroidism, postsurgical   • Encounter for insertion of ParaGard IUD   • Intrauterine device surveillance   • Encounter for initial prescription of intrauterine contraceptive device (IUD)   • IUD check up      Past Medical History:   Diagnosis Date   • Anemia     hx of   • Hypothyroidism affecting pregnancy 2017   • Ovarian cyst     age 13   • Status post vacuum-assisted vaginal delivery 2017   • Thyroid cancer ([facility] [address] )     thyroid CA- throidectomy 2017   • Urinary tract infection     hx of uti's   • Varicella     childhood chickenpox      Past Surgical History:   Procedure Laterality Date   • THYROIDECTOMY     • VAGINAL DELIVERY     • WISDOM TOOTH EXTRACTION        Family History   Problem Relation Age of Onset   • Cancer Mother    • Stroke Father    • Heart disease Father         defect   • Cancer Paternal Grandfather         throat     Social History     Tobacco Use   • Smoking status: Former Smoker     Quit date: 2016     Years since quittin 8   • Smokeless tobacco: Never Used   • Tobacco comment: quit 2016   Substance Use Topics   • Alcohol use: No     Allergies   Allergen Reactions   • Aspirin Anaphylaxis and Angioedema         Current Outpatient Medications:   •  levonorgestrel (MIRENA) 20 MCG/24HR IUD, 1 each by Intrauterine route once, Disp: , Rfl:   •  levothyroxine 150 mcg tablet, Take 1 tablet (150 mcg total) by mouth daily, Disp: 100 tablet, Rfl: 1  Review of Systems   Constitutional: Positive for fatigue  Negative for chills, diaphoresis and unexpected weight change  HENT: Negative for trouble swallowing and voice change  Eyes: Negative for photophobia and visual disturbance  Gastrointestinal: Positive for constipation  Negative for diarrhea  Endocrine: Negative for cold intolerance and heat intolerance  Musculoskeletal: Negative for arthralgias, joint swelling and myalgias  Skin: Negative  Physical Exam:  Body mass index is 34 4 kg/m²  /76   Pulse 72   Ht 5' 6 5" (1 689 m)   Wt 98 2 kg (216 lb 6 4 oz)   BMI 34 40 kg/m²    Wt Readings from Last 3 Encounters:   11/01/22 98 2 kg (216 lb 6 4 oz)   07/02/21 102 kg (224 lb 3 2 oz)   05/20/21 103 kg (226 lb)     GEN: NAD  Eyes: no stare or proptosis, nl lids and conjunctiva, EOMI  Neck: trachea midline, thyroid NT to palpation, nl in size, no nodules or neck masses noted, no cervical LAD  CV; heart reg rate s1s2 nl, no m/r/g appreciated, no LAWRENCE  Resp: CTAB, good effort  Ab+BS  Neuro: no tremor, 2+ DTRs in BUE  Skin: warm and dry, no palmar erythema  Psych: nl mood and affect, no gross lapses in memory    DATA:  Labs:    08/21/20 03/04/19 06/26/18    Thyroglobulin (ng/mL) <0 2 <0 2 <0 2        08/21/20  08/09/19  05/06/19  03/04/19  11/15/18  10/08/18    Thyroid Stimulating Hormone 77 60 3 04  <0 01 Low  0 82 0 70 1 40      08/21/20 03/04/19 06/26/18    Thyroglobulin AutoAb <20 <20 <20     Radiology    Impression:  1  Hypothyroidism, postsurgical       Plan:    Marbin Alvarez was seen today for hypothyroidism  Diagnoses and all orders for this visit:    Hypothyroidism, postsurgical  -     TSH, 3rd generation with Free T4 reflex; Future  -     levothyroxine 150 mcg tablet; Take 1 tablet (150 mcg total) by mouth daily      1   Hypothyroidism, likely due to post surgical from total thyroidectomy for ? PTC  I requested patient obtain and send me atleast the pathology from her thyroid surgery and also last US if possible to risk stratify her from a malignancy risk to determine appropriate TSH goal  However from the history reported and neg Tg levels since last few years and reported normal US last year and given her age most likely has stage 1 thyroid cancer with low risk of mortality and probably also had a low risk thyroid cancer  Therefore for now unless records indicate otherwise will aim for TSH 0 5-2 0 range  Tg once a year, only if abnormal will need f/u US  From a hypothyroid aspect, patient does not appear to be overtly hypothyroid or hyperthyroid, On appropriate weight based dosing for levothyroxine at 150mcg  Will simple recheck labs for now and adjust dose if needed  2  Also discussed that typically hypocalcemia from thyroidectomy are transient and should not last 5 years from now  Suggested symptoms of hand numbness may NOT be calcium related  Suggested reach out to me if symptoms arise and will be happy to check calcium levels before she takes any OTC supplement to see if truly the cause of not  If not calcium supplementation only placebo effect at that point  RTC in 6 months for follow up  Send records to us for further review       Discussed with the patient and all questioned fully answered  She will call me if any problems arise          Kristen Reyna MD

## 2022-11-10 DIAGNOSIS — E89.0 HYPOTHYROIDISM, POSTSURGICAL: Primary | ICD-10-CM

## 2022-11-10 DIAGNOSIS — E89.0 HYPOTHYROIDISM, POSTSURGICAL: ICD-10-CM

## 2022-11-10 LAB
SL AMB T4, FREE (DIRECT): 1.19 NG/DL (ref 0.82–1.77)
TSH SERPL DL<=0.005 MIU/L-ACNC: 22.2 UIU/ML (ref 0.45–4.5)

## 2022-11-10 RX ORDER — LEVOTHYROXINE SODIUM 175 UG/1
TABLET ORAL
Qty: 90 TABLET | Refills: 1 | Status: SHIPPED | OUTPATIENT
Start: 2022-11-10

## 2022-11-10 RX ORDER — LEVOTHYROXINE SODIUM 175 UG/1
175 TABLET ORAL DAILY
Qty: 60 TABLET | Refills: 1 | Status: SHIPPED | OUTPATIENT
Start: 2022-11-10 | End: 2022-11-10

## 2022-11-15 DIAGNOSIS — E89.0 HYPOTHYROIDISM, POSTSURGICAL: Primary | ICD-10-CM

## 2022-11-15 LAB
SL AMB T4, FREE (DIRECT): 1.38 NG/DL (ref 0.82–1.77)
THYROGLOB AB SERPL-ACNC: <1 IU/ML (ref 0–0.9)
TSH SERPL DL<=0.005 MIU/L-ACNC: 11.8 UIU/ML (ref 0.45–4.5)

## 2023-01-11 DIAGNOSIS — E89.0 HYPOTHYROIDISM, POSTSURGICAL: ICD-10-CM

## 2023-01-11 DIAGNOSIS — Z30.430 ENCOUNTER FOR INSERTION OF PARAGARD IUD: Primary | ICD-10-CM

## 2023-01-11 LAB
SL AMB T4, FREE (DIRECT): 1.46 NG/DL (ref 0.82–1.77)
TSH SERPL DL<=0.005 MIU/L-ACNC: 10.9 UIU/ML (ref 0.45–4.5)

## 2023-01-11 RX ORDER — LEVOTHYROXINE SODIUM 175 MCG
175 TABLET ORAL DAILY
Qty: 60 TABLET | Refills: 1 | Status: SHIPPED | OUTPATIENT
Start: 2023-01-11

## 2023-01-23 DIAGNOSIS — E66.9 OBESITY (BMI 30.0-34.9): Primary | ICD-10-CM

## 2023-02-23 LAB — TSH SERPL DL<=0.005 MIU/L-ACNC: 2.1 UIU/ML (ref 0.45–4.5)

## 2023-02-27 ENCOUNTER — OFFICE VISIT (OUTPATIENT)
Dept: ENDOCRINOLOGY | Facility: HOSPITAL | Age: 33
End: 2023-02-27

## 2023-02-27 VITALS
HEIGHT: 67 IN | WEIGHT: 216.8 LBS | BODY MASS INDEX: 34.03 KG/M2 | DIASTOLIC BLOOD PRESSURE: 60 MMHG | HEART RATE: 83 BPM | SYSTOLIC BLOOD PRESSURE: 110 MMHG

## 2023-02-27 DIAGNOSIS — E66.1 CLASS 1 DRUG-INDUCED OBESITY WITHOUT SERIOUS COMORBIDITY WITH BODY MASS INDEX (BMI) OF 34.0 TO 34.9 IN ADULT: ICD-10-CM

## 2023-02-27 DIAGNOSIS — E89.0 HYPOTHYROIDISM, POSTSURGICAL: Primary | ICD-10-CM

## 2023-02-27 PROBLEM — E66.811 CLASS 1 DRUG-INDUCED OBESITY WITHOUT SERIOUS COMORBIDITY WITH BODY MASS INDEX (BMI) OF 34.0 TO 34.9 IN ADULT: Status: ACTIVE | Noted: 2023-02-27

## 2023-02-27 NOTE — PROGRESS NOTES
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 Precancer cells and therefore had total thyroidectomy w positive PTC- pathology was [id number]  Surgery in [place]- no records on file  Since then patient reports thyroid control had been labile  Patient did have periods of self stopping her levothyroxine leading to high TSH, d/t which her levothyroxine dose was drastically increased and lead to iatrogenic hyperthyroidism  Patient also had thyroid absorption testing done in the past in 2018 d/t labile control  When initially seen, patient was on 150mcg levothyroxine and complained of weight gain- had lost weight when was hyperthyroid but gained it back once dose reduced  Also reported  Given labile control, started on weight based synthroid 175mcg instead 11/22 with TSH since in euthyroid range now TSH 02/23 2 1   - currently feels much better and reports energy is much more now  Obesity management:-   Weight Hx:- Reports she was never a skinny person but since birth of her children- started gaining a lot of weight but unable to lose it  Also reports was hard to say it was d/t her thyroid also being off every now and then  Does report weight loss down to 188lbs when was on 225mcg levothyroxine and over replaced per labs  Reports steady slow increase in weight  Highest weight was 235lbs, Lowest weight 188lbs when over replaced on thyroid  Current weight 216lbs  Diet- Tried keto diet in the past - when lost weight but didn't feel this was sustainable and also was hyperthyroid then so unsure if weight loss d/t diet or thyroid  Does not calorie count   Did give lose it a try but felt regional foods are not offered on [facility]  And  mainly cooks at home    Exercise- Tried to exercise 3x weekly, does mix of weight and cardio  Medication- Ozempic 0 25mg started on  for weight loss, currently 215lbs, was 226lbs before starting ozempic       Patient Active Problem List   Diagnosis   • Hypothyroidism, postsurgical   • Encounter for insertion of ParaGard IUD   • Intrauterine device surveillance   • Encounter for initial prescription of intrauterine contraceptive device (IUD)   • IUD check up   • Class 1 drug-induced obesity without serious comorbidity with body mass index (BMI) of 34 0 to 34 9 in adult      Past Medical History:   Diagnosis Date   • Anemia     hx of   • Hypothyroidism affecting pregnancy 2017   • Ovarian cyst     age 13   • Status post vacuum-assisted vaginal delivery 2017   • Thyroid cancer ([facility] [address] )     thyroid CA- throidectomy 2017   • Urinary tract infection     hx of uti's   • Varicella     childhood chickenpox      Past Surgical History:   Procedure Laterality Date   • THYROIDECTOMY     • VAGINAL DELIVERY     • WISDOM TOOTH EXTRACTION        Family History   Problem Relation Age of Onset   • Cancer Mother    • Stroke Father    • Heart disease Father         defect   • Cancer Paternal Grandfather         throat     Social History     Tobacco Use   • Smoking status: Former     Types: Cigarettes     Quit date: 2016     Years since quittin 1   • Smokeless tobacco: Never   • Tobacco comments:     quit 2016   Substance Use Topics   • Alcohol use: No     Allergies   Allergen Reactions   • Aspirin Anaphylaxis and Angioedema       Current Outpatient Medications:   •  levonorgestrel (MIRENA) 20 MCG/24HR IUD, 1 each by Intrauterine route once, Disp: , Rfl:   •  semaglutide, 0 25 or 0 5 mg/dose, (Ozempic) 2 mg/1 5 mL injection pen, Inject 0 19 mL (0 25 mg total) under the skin every 7 days, Disp: 3 mL, Rfl: 1  •  Synthroid 175 MCG tablet, Take 1 tablet (175 mcg total) by mouth daily, Disp: 60 tablet, Rfl: 1    Review of Systems   Constitutional: Negative for chills, diaphoresis, fatigue and unexpected weight change  HENT: Negative for trouble swallowing and voice change  Eyes: Negative for photophobia and visual disturbance  Gastrointestinal: Negative for constipation and diarrhea  Endocrine: Negative for cold intolerance and heat intolerance  Musculoskeletal: Negative for arthralgias, joint swelling and myalgias  Skin: Negative  Physical Exam:  Body mass index is 34 47 kg/m²  /60   Pulse 83   Ht 5' 6 5" (1 689 m)   Wt 98 3 kg (216 lb 12 8 oz)   BMI 34 47 kg/m²    Wt Readings from Last 3 Encounters:   02/27/23 98 3 kg (216 lb 12 8 oz)   11/01/22 98 2 kg (216 lb 6 4 oz)   07/02/21 102 kg (224 lb 3 2 oz)       Physical Exam  Constitutional:       Appearance: Normal appearance  She is obese  Cardiovascular:      Rate and Rhythm: Normal rate and regular rhythm  Pulses: Normal pulses  Pulmonary:      Effort: Pulmonary effort is normal    Abdominal:      General: Abdomen is flat  Bowel sounds are normal       Palpations: Abdomen is soft  Skin:     General: Skin is warm and dry  Capillary Refill: Capillary refill takes less than 2 seconds  Neurological:      General: No focal deficit present  Mental Status: She is alert and oriented to person, place, and time     Psychiatric:         Mood and Affect: Mood normal          Labs:      Latest Reference Range & Units 08/15/18 10:05 12/28/18 13:00 11/09/22 09:17 11/14/22 09:24 01/10/23 09:15 02/22/23 09:07   TSH, POC 0 450 - 4 500 uIU/mL   22 200 (H) 11 800 (H) 10 900 (H) 2 100   TSH 3RD GENERATON 0 358 - 3 740 uIU/mL 29 100 (H)        Free T4 0 76 - 1 46 ng/dL 0 94        THYROGLOBULIN AB 0 0 - 0 9 IU/mL    <1 0     TSH Baseline uIU/mL  0 781       TSH 30 Minutes uIU/ML  0 800       (H): Data is abnormally high    Impression & Plan:    Problem List Items Addressed This Visit        Endocrine    Hypothyroidism, postsurgical - Primary       Other    Class 1 drug-induced obesity without serious comorbidity with body mass index (BMI) of 34 0 to 34 9 in adult       No orders of the defined types were placed in this encounter  There are no Patient Instructions on file for this visit  1  Hypothyroidism, likely due to post surgical from total thyroidectomy in 2017 for PTC [id number], Given low risk for thyroid cancer based on pathology goal TSH 0 2-2 0 range  Last Tg 11/22 was neg, Given low risk tumor s/p resection >5 years ago, will only check US if Tg marker positive- repeat annually  Previously on levothyroxine with labile labs, started on synthroid 11/22 with improved control, most recent TSH 02/23 was 2 10 which is close to goal and therefore d/t 175mcg synthroid for now  Patient clinically improved  Did discuss weight changes may change thyroid replacement requirements, she will let me know if having any symptoms of concern, Repeat TSH in 6 weeks for follow up    2  Obesity Class 1 with BMI 34 47 w/o serious comorbid conditions  Currently weight 215lbs, patient started on ozempic 0 25mg weekly on 01/23, with 5% weight loss in last month itself, given since not been on this for >3 months and continued weight loss possible c/w current dose of ozempic at 0 25mg weekly, if weight loss platuea she will let me know and then can increase to 0 5mg weekly dose  Discussed importance of diet changes with calorie counting and trying to aim for 500 calorie deficit daily to lose weight  She is exercising currently  Needs to work on calorie counting for long term weight management  Discussed obesity as a chronic disease needing chronic management  F/u in 3 months to review obesity and thyroid management    Discussed with the patient and all questioned fully answered  She will call me if any problems arise      Counseled patient on diagnostic results, prognosis, risk and benefit of treatment options, instruction for management, importance of treatment compliance, Risk  factor reduction and impressions      Goran Vargas MD

## 2023-03-07 DIAGNOSIS — E89.0 HYPOTHYROIDISM, POSTSURGICAL: ICD-10-CM

## 2023-03-07 RX ORDER — LEVOTHYROXINE SODIUM 175 MCG
175 TABLET ORAL DAILY
Qty: 60 TABLET | Refills: 1 | Status: SHIPPED | OUTPATIENT
Start: 2023-03-07

## 2023-03-21 DIAGNOSIS — E89.0 HYPOTHYROIDISM, POSTSURGICAL: ICD-10-CM

## 2023-03-21 RX ORDER — LEVOTHYROXINE SODIUM 175 UG/1
175 TABLET ORAL DAILY
Qty: 90 TABLET | Refills: 0 | Status: SHIPPED | OUTPATIENT
Start: 2023-03-21

## 2023-04-06 DIAGNOSIS — E66.9 OBESITY (BMI 30.0-34.9): ICD-10-CM

## 2023-04-29 LAB — TSH SERPL DL<=0.005 MIU/L-ACNC: 1.63 UIU/ML (ref 0.45–4.5)

## 2023-06-05 ENCOUNTER — OFFICE VISIT (OUTPATIENT)
Dept: ENDOCRINOLOGY | Facility: HOSPITAL | Age: 33
End: 2023-06-05
Payer: COMMERCIAL

## 2023-06-05 VITALS
DIASTOLIC BLOOD PRESSURE: 70 MMHG | SYSTOLIC BLOOD PRESSURE: 120 MMHG | HEIGHT: 67 IN | BODY MASS INDEX: 33.27 KG/M2 | HEART RATE: 79 BPM | WEIGHT: 212 LBS

## 2023-06-05 DIAGNOSIS — E66.1 CLASS 1 DRUG-INDUCED OBESITY WITHOUT SERIOUS COMORBIDITY WITH BODY MASS INDEX (BMI) OF 34.0 TO 34.9 IN ADULT: ICD-10-CM

## 2023-06-05 DIAGNOSIS — E89.0 HYPOTHYROIDISM, POSTSURGICAL: Primary | ICD-10-CM

## 2023-06-05 PROCEDURE — 99214 OFFICE O/P EST MOD 30 MIN: CPT | Performed by: STUDENT IN AN ORGANIZED HEALTH CARE EDUCATION/TRAINING PROGRAM

## 2023-06-05 NOTE — PROGRESS NOTES
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 Precancer cells and therefore had total thyroidectomy w positive PTC- pathology was [id number]  Surgery in [place]- no records on file  History of labile thyroid control while on levothyroxine, therefore switched to synthroid with stable control now  TSH 04/23 1 630 while on synthroid 175mcg daily  Currently feels much better and also reports hair loss has slowed down now  Still has some hair loss but not a lot  Feels current dose is working  Obesity management:-   Weight Hx:- Started gaining weight since birth of her children  Lowest weight 188lbs when on high dose thyroid replacement  Highest weight was 235lbs    Weight on initial visit- 11/22- 216lbs  (was 226lbs before starting ozempic)  02/23- 215lbs   05/23- 212lbs    Diet- Tried keto diet in the past and did lose weight, currently doing calorie restriction diet and also counting carbs  Reports feeling food averse by fatty food and unhealthy food  Exercise- Tried to exercise 3x weekly, does mix of weight and cardio    Medication- Ozempic 0 25mg started on 01/23 for weight loss, 02/23 215lbs, was 226lbs before starting ozempic  Currently on ozempic 0 5mg weekly, 06/23 212lbs       Patient Active Problem List   Diagnosis   • Hypothyroidism, postsurgical   • Encounter for insertion of ParaGard IUD   • Intrauterine device surveillance   • Encounter for initial prescription of intrauterine contraceptive device (IUD)   • IUD check up   • Class 1 drug-induced obesity without serious comorbidity with body mass index (BMI) of 34 0 to 34 9 in adult      Past Medical "History:   Diagnosis Date   • Anemia     hx of   • Hypothyroidism affecting pregnancy 2017   • Ovarian cyst     age 13   • Status post vacuum-assisted vaginal delivery 2017   • Thyroid cancer ([address] )     thyroid CA- throidectomy 2017   • Urinary tract infection     hx of uti's   • Varicella     childhood chickenpox      Past Surgical History:   Procedure Laterality Date   • THYROIDECTOMY     • VAGINAL DELIVERY     • WISDOM TOOTH EXTRACTION        Family History   Problem Relation Age of Onset   • Cancer Mother    • Stroke Father    • Heart disease Father         defect   • Cancer Paternal Grandfather         throat     Social History     Tobacco Use   • Smoking status: Former     Types: Cigarettes     Quit date: 2016     Years since quittin 4   • Smokeless tobacco: Never   • Tobacco comments:     quit 2016   Substance Use Topics   • Alcohol use: No     Allergies   Allergen Reactions   • Aspirin Anaphylaxis and Angioedema       Current Outpatient Medications:   •  levonorgestrel (MIRENA) 20 MCG/24HR IUD, 1 each by Intrauterine route once, Disp: , Rfl:   •  levothyroxine (Synthroid) 175 mcg tablet, Take 1 tablet (175 mcg total) by mouth daily, Disp: 90 tablet, Rfl: 0  •  semaglutide, 1 mg/dose, (Ozempic, 1 MG/DOSE,) 4 mg/3 mL injection pen, Inject 0 75 mL (1 mg total) under the skin every 7 days, Disp: 6 mL, Rfl: 1    Review of Systems   Constitutional: Negative for chills, diaphoresis, fatigue and unexpected weight change  HENT: Negative for trouble swallowing and voice change  Eyes: Negative for photophobia and visual disturbance  Gastrointestinal: Negative for constipation and diarrhea  Endocrine: Negative for cold intolerance and heat intolerance  Musculoskeletal: Negative for arthralgias, joint swelling and myalgias  Skin: Negative  Physical Exam:  Body mass index is 33 71 kg/m²    /70   Pulse 79   Ht 5' 6 5\" (1 689 m)   Wt 96 2 kg (212 lb)   BMI 33 71 kg/m²  " Wt Readings from Last 3 Encounters:   06/05/23 96 2 kg (212 lb)   02/27/23 98 3 kg (216 lb 12 8 oz)   11/01/22 98 2 kg (216 lb 6 4 oz)       Physical Exam  Constitutional:       Appearance: Normal appearance  She is obese  Cardiovascular:      Rate and Rhythm: Normal rate and regular rhythm  Pulses: Normal pulses  Pulmonary:      Effort: Pulmonary effort is normal    Abdominal:      General: Abdomen is flat  Bowel sounds are normal       Palpations: Abdomen is soft  Skin:     General: Skin is warm and dry  Capillary Refill: Capillary refill takes less than 2 seconds  Neurological:      General: No focal deficit present  Mental Status: She is alert and oriented to person, place, and time  Psychiatric:         Mood and Affect: Mood normal          Labs:    Latest Reference Range & Units 12/28/18 13:00 11/09/22 09:17 11/14/22 09:24 01/10/23 09:15 02/22/23 09:07 04/28/23 09:24   TSH, POC 0 450 - 4 500 uIU/mL  22 200 (H) 11 800 (H) 10 900 (H) 2 100 1 630   THYROGLOBULIN AB 0 0 - 0 9 IU/mL   <1 0      TSH Baseline uIU/mL 0 781        TSH 30 Minutes uIU/ML 0 800        (H): Data is abnormally high    Impression & Plan:    Problem List Items Addressed This Visit        Endocrine    Hypothyroidism, postsurgical - Primary    Relevant Orders    T4, free    TSH, 3rd generation       Other    Class 1 drug-induced obesity without serious comorbidity with body mass index (BMI) of 34 0 to 34 9 in adult    Relevant Medications    semaglutide, 1 mg/dose, (Ozempic, 1 MG/DOSE,) 4 mg/3 mL injection pen       Orders Placed This Encounter   Procedures   • T4, free     Standing Status:   Future     Standing Expiration Date:   6/5/2024   • TSH, 3rd generation     This is a patient instruction: This test is non-fasting  Please drink two glasses of water morning of bloodwork          Standing Status:   Future     Standing Expiration Date:   6/5/2024       There are no Patient Instructions on file for this visit     1  Hypothyroidism, likely due to post surgical from total thyroidectomy in 2017 for PTC [id number], Given low risk for thyroid cancer based on pathology goal TSH 0 2-2 0 range  Last Tg 11/22 was neg, Given low risk tumor s/p resection >5 years ago, will only check US if Tg marker positive- repeat annually  Repeat TG tumor marker 11/23  Labile thyroid control on levothyroxine, stable since being on synthroid, most recent TSH 04/23 was 1 630  c/w 175mcg synthroid for now  Repeat TSH in 3 months now  2  Obesity Class 1 with BMI 34 47 w/o serious comorbid conditions  Currently weight 212lbs, patient started on ozempic 0 25mg weekly on 01/23, did lose 5% initially but since then weight loss slowed therefore increased dose to 0 5mg weekly  Been on this since 04/23, Currently doing well with some constipation on this, reports taking dulcolax for this weekly- may want to try miralax instead  Continuous to lose weight but since weight loss has plateau Increase ozempic to 1mg weekly for now instead of 0 5mg weekly  Continue to work on exercise and also continue calorie counting  F/u in 3 months to review obesity and thyroid management    Discussed with the patient and all questioned fully answered  She will call me if any problems arise      Counseled patient on diagnostic results, prognosis, risk and benefit of treatment options, instruction for management, importance of treatment compliance, Risk  factor reduction and impressions      Darling Toure MD

## 2023-07-25 DIAGNOSIS — E89.0 HYPOTHYROIDISM, POSTSURGICAL: ICD-10-CM

## 2023-07-25 RX ORDER — LEVOTHYROXINE SODIUM 175 MCG
175 TABLET ORAL DAILY
Qty: 90 TABLET | Refills: 0 | Status: SHIPPED | OUTPATIENT
Start: 2023-07-25

## 2023-08-08 ENCOUNTER — DOCUMENTATION (OUTPATIENT)
Dept: ENDOCRINOLOGY | Facility: HOSPITAL | Age: 33
End: 2023-08-08

## 2023-08-08 DIAGNOSIS — E66.9 OBESITY (BMI 30.0-34.9): Primary | ICD-10-CM

## 2023-08-08 NOTE — PROGRESS NOTES
Prior auth for the patient's Ozempic was sent to [employer]. Will await response for the insurance company and when received the patient and the pharmacy will be informed.

## 2023-08-08 NOTE — PROGRESS NOTES
Prior auth for Ozempic was denied and the provider wanted a new prior done for [facility]LINDSAY healy

## 2023-08-09 DIAGNOSIS — E66.9 OBESITY (BMI 30.0-34.9): Primary | ICD-10-CM

## 2023-08-16 ENCOUNTER — CLINICAL SUPPORT (OUTPATIENT)
Dept: NUTRITION | Facility: HOSPITAL | Age: 33
End: 2023-08-16
Attending: STUDENT IN AN ORGANIZED HEALTH CARE EDUCATION/TRAINING PROGRAM
Payer: COMMERCIAL

## 2023-08-16 VITALS — BODY MASS INDEX: 31.61 KG/M2 | WEIGHT: 198.8 LBS

## 2023-08-16 DIAGNOSIS — E66.9 OBESITY (BMI 30.0-34.9): ICD-10-CM

## 2023-08-16 PROCEDURE — 97802 MEDICAL NUTRITION INDIV IN: CPT | Performed by: DIETITIAN, REGISTERED

## 2023-08-16 NOTE — PROGRESS NOTES
Nutrition Assessment Form    Patient Name: Samantha Brunson    YOB: 1990    Sex: Female     Assessment Date: 8/16/2023  Start Time: 11 Stop Time: 12 Total Minutes: 61     Data:  Present at session: self   Parent/Patient Concerns/reason for visit: "I was on ozempic since the beginning of the year and my insunance stopped it"   Medical Dx/Reason for Referral: obesity   Past Medical History:   Diagnosis Date   • Anemia     hx of   • Hypothyroidism affecting pregnancy 7/8/2017   • Ovarian cyst     age 13   • Status post vacuum-assisted vaginal delivery 7/9/2017   • Thyroid cancer ([address])     thyroid CA- throidectomy 9/2017   • Urinary tract infection     hx of uti's   • Varicella     childhood chickenpox       Current Outpatient Medications   Medication Sig Dispense Refill   • levonorgestrel (MIRENA) 20 MCG/24HR IUD 1 each by Intrauterine route once     • Semaglutide-Weight Management (WEGOVY) 1 MG/0.5ML Inject 0.5 mL (1 mg total) under the skin once a week 2 mL 2   • Synthroid 175 MCG tablet TAKE 1 TABLET DAILY 90 tablet 0     No current facility-administered medications for this visit. Additional Meds/Supplements: n/a   Special Learning Needs/barriers to learning/any new barriers n/a   Height: HC Readings from Last 5 Encounters:   No data found for [facility].      Weight: Wt Readings from Last 10 Encounters:   06/05/23 96.2 kg (212 lb)   02/27/23 98.3 kg (216 lb 12.8 oz)   11/01/22 98.2 kg (216 lb 6.4 oz)   07/02/21 102 kg (224 lb 3.2 oz)   05/20/21 103 kg (226 lb)   05/12/21 101 kg (223 lb 3.2 oz)   06/13/19 89.4 kg (197 lb)   05/14/19 90.3 kg (199 lb)   04/04/19 90.3 kg (199 lb)   03/12/19 105 kg (232 lb)     Estimated body mass index is 33.71 kg/m² as calculated from the following:    Height as of 6/5/23: 5' 6.5" (1.689 m). Weight as of 6/5/23: 96.2 kg (212 lb).    Recent Weight Change: [x]Yes     []No  Amount: 30# since starting wegovy desired and intentional      Energy Needs: [id number] (25cals/kg - 500-1000 for 1/2# wk)    Allergies   Allergen Reactions   • Aspirin Anaphylaxis and Angioedema    or intolerances NKFA   Social History     Substance and Sexual Activity   Alcohol Use No       Social History     Tobacco Use   Smoking Status Former   • Types: Cigarettes   • Quit date: 2016   • Years since quittin.6   Smokeless Tobacco Never   Tobacco Comments    quit 2016       Who shops? patient  And spouse   Who cooks/cooking methods/Eating out/take out habits   patient  And spouse    Not even 1x/wk     Exercise: Nothing formal       Other: ie: Sleep habits/ stress level/ work habits household-lives with ?/ food security Waking up at 42-51-49-67 and asleep at 1130- she does not take any naps  Stress level is 6/10 (kids home for summer)  works a lot  Lives with  and children (4 and 6)- has had thyroid issues for 6 years-   Not working outside of the home   Prior Nutritional Counseling? []Yes     [x]No  When:      Why:         Diet Hx:  Drinks water all day - x32oz or 2 16.9 oz bottles daily  Breakfast: Diet:  Drinking coffee in the morning x 12 oz with creamer-x2oz SF    Lunch: 1230-1 - chiken fried rice, shrimp with penne, chicken with salad or eggs - lettuce corn chicken cheese or eggs  S/w cheese -american 1 sl and meat - ham or turkey 2 slices     Dinner: 364-  Kids practices until 8 (  is  so they are there at 11)  otherwise will eat at 630-7 (when football is over)  leftovers from lunch - meat starch not much vegetables     Snacks: AM -   PM -   HS - snacking before ozempic - anxiety   Other Notes/ Initial Assessment:  Martin Benson is here to lose weight, her wt has been creeping up with thyroid issues,  She has had trouble regulating with her MD.   She has been a big sleeper her whole life. She is not active. She has been taking wegovy since the  Beginning to the year to lose weight. She is from [place] and just got her citizenship.   She says she has no self control and feels she could have an eating disorder with binging. Counseling was recommended and she would like to pursue it. Her  works as a  and works for [employer] and works long hours including OT. Discussed approp meals, snacks, importance of consistent activity, adequate sleep, fluids, stress management and their impact on wt and overall health. Provided examples of meals times, and importance of front loading calories, slowing down meal times, etc.    Provided 1500 cals sample menus, wt loss tips from [facility], health snacks ideas and RD name and number for home use. Follow up set for [facility] in Sept.      Updated assessment (Follow up note only):       Nutrition Diagnosis:   Overweight/obesity  related to Excess energy intake as evidenced by  BMI more than normative standard for age and sex (obesity-grade I 30-34. 9)       Any change or new dx since previous visit:     Nutrition Diagnosis:   n/a      Medical Nutrition Therapy Intervention:  [x]Individualized Meal Plan-Discussed the importance of eating 3 meals daily and not skipping, and how metabolism is affected. Also discussed adding in small snacks or 5-6 small meals daily if desired vs 3 larger ones, and appropriate options and portions. Appropriate timing of meals, including eating within 1 hr of waking and eating meals slowly 20mins/meals, minimizing mindless/boredom or habitual eating, etc was also mentioned. Discussed the plate method of portioning foods, including half a plate fruits and vegetables or a half plate all vegetables, 1/4 of the plate a lean protein source or meat, and a 1/4 of the plate being a whole grain carb- usually 1/2-1c. This should be followed for at least 2 meals of the day, but could also be followed for all 3. Fluid intake discussed and appropriate amounts and choices, 16 oz with meals and additional 32oz during the day. S/S dehydration also covered.    []Understanding Lab Values []Basic Pathophysiology of Disease []Food/Medication Interactions   []Food Diary [x]Exercise-150 mins of moderate activity weekly, discussed importance of variety of activity, including wt bearing activities 2-3x/wk x 10-15mins. Discussed importance of activity throughout the lifecycle and its impact on overall health/stress management, etc.   [x]Lifestyle/Behavior Modification Techniques-Discussed the importance of adequate sleep, and ideal sleeping conditions, including a cool temperature 64-68 degrees F, and a dark and quiet room. Also discussed the importance of a regular and consistent sleep routine, including minimizing blue light exposure an hour before sleeping. Weekend habits should include staying fairly consistent with weekday sleep habits to minimize disruption during the week. A connection was made between getting adequate and good quality sleep and the ability to handle stress the next day, make healthy food choices, and be active. []Medication, Mechanism of Action   []Label Reading: CHO/ Na/ Fat/ other_________ []Self Blood Glucose Monitoring   [x]Weight/BMI Goals: gain/lose/maintain-Short term goal of 2-4# x 1 month or next visit []Other -           Comprehension: []Excellent  []Very Good  [x]Good  []Fair   []Poor    Receptivity: []Excellent  []Very Good  [x]Good  []Fair   []Poor    Expected Compliance: []Excellent  []Very Good  [x]Good  []Fair   []Poor        Goals (initial)/ Progress made on previous goals/new goals:  1. Food log daily until next visit   2.  3 meals daily no skipping   3.  16 oz fluids with meals and additional 16-23 oz daily       No follow-ups on file.   Labs:  CMP  No results found for: "NA", "K", "CL", "CO2", "ANIONGAP", "BUN", "CREATININE", "GLUCOSE", "GLUF", "CALCIUM", "CORRECTEDCA", "AST", "ALT", "ALKPHOS", "PROT", "BILITOT", "EGFR"    BMP  No results found for: "GLUCOSE", "CALCIUM", "NA", "K", "CO2", "CL", "BUN", "CREATININE"    Lipids  No results found for: "CHOL"  No results found for: "HDL"  No results found for: "[facility]"  No results found for: "TRIG"  No results found for: "CHOLHDL"    Hemoglobin A1C  Lab Results   Component Value Date    HGBA1C 5.3 10/08/2018       Fasting Glucose  No results found for: "GLUF"    Insulin     Thyroid  Lab Results   Component Value Date    TSH 1.630 04/28/2023       Hepatic Function Panel  No results found for: "ALT", "AST", "GGT", "ALKPHOS", "BILITOT"    Celiac Disease Antibody Panel  No results found for: "ENDOMYSIAL IGA", "GLIADIN IGA", "GLIADIN IGG", "IGA", "TISSUE TRANSGLUT AB", "TTG IGA"   Iron  No results found for: "IRON", "TIBC", "FERRITIN"         Dylan Esparza, [address] [place] [phone]

## 2023-09-06 ENCOUNTER — OFFICE VISIT (OUTPATIENT)
Dept: ENDOCRINOLOGY | Facility: HOSPITAL | Age: 33
End: 2023-09-06
Payer: COMMERCIAL

## 2023-09-06 VITALS
SYSTOLIC BLOOD PRESSURE: 120 MMHG | DIASTOLIC BLOOD PRESSURE: 70 MMHG | OXYGEN SATURATION: 99 % | WEIGHT: 197.4 LBS | HEART RATE: 86 BPM | HEIGHT: 67 IN | BODY MASS INDEX: 30.98 KG/M2

## 2023-09-06 DIAGNOSIS — E66.1 CLASS 1 DRUG-INDUCED OBESITY WITHOUT SERIOUS COMORBIDITY WITH BODY MASS INDEX (BMI) OF 34.0 TO 34.9 IN ADULT: ICD-10-CM

## 2023-09-06 DIAGNOSIS — E89.0 HYPOTHYROIDISM, POSTSURGICAL: Primary | ICD-10-CM

## 2023-09-06 PROCEDURE — 99214 OFFICE O/P EST MOD 30 MIN: CPT | Performed by: STUDENT IN AN ORGANIZED HEALTH CARE EDUCATION/TRAINING PROGRAM

## 2023-09-06 RX ORDER — SEMAGLUTIDE 1.7 MG/.75ML
INJECTION, SOLUTION SUBCUTANEOUS
COMMUNITY
Start: 2023-08-29

## 2023-09-06 NOTE — PROGRESS NOTES
Established Patient Progress Note       Chief Complaint   Patient presents with   • Hypothyroidism      History of Present Illness:     Paul Escobedo is a 33 y.o. female with a history of post surgical hypothyroidism for benign thyroid nodules with prior history of hashimoto, Class 1 obesity who is seen today for Obesity and thyroid management. 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 Precancer cells and therefore had total thyroidectomy w positive PTC- pathology was [id number]. Surgery in [facility]- no records on file. History of labile thyroid control while on levothyroxine, therefore switched to synthroid. Most recent TSH 08/23 0.7 with free t4 1.77 (slightly upper limits) while on synthroid 175mcg daily. Currently feels good. Does have some constipation issues possible d/t wegovy but takes slow move tea and dulcolax and this helps. Labs one through lab [facility]- reviewed on phone    Obesity management:-   Weight Hx:- Started gaining weight since birth of her children. Lowest weight 188lbs when on high dose thyroid replacement. Highest weight was 235lbs    Weight on initial visit- 11/22- 216lbs. (was 226lbs before starting ozempic)  02/23- 215lbs   05/23- 212lbs    However recently ozempic got denied, we tried for wegovy but this was denied as well by her insurance. She then followed up with her PCP in [facility] who was able to get wegovy approved for 1 year and patient currently on wegovy 1.7mg weekly- started 1 week ago. Current weight 196lbs and feels happy as she is under 200lbs which is her goal.   Has also started to follow weight loss program through [facility] 08/23  Diet-  currently doing calorie restriction diet and also counting carbs. Exercise- does mix of weight and cardio  Medication- Ozempic 0.25mg started on 01/23 for weight loss, 02/23 215lbs, was 226lbs before starting ozempic. ozempic 0.5mg weekly 06/23 212lbs. Stopped 08/23, switched to wegovy 1.7mg weekly 09/23, current weight 196lbs. Patient Active Problem List   Diagnosis   • Hypothyroidism, postsurgical   • Encounter for insertion of ParaGard IUD   • Intrauterine device surveillance   • Encounter for initial prescription of intrauterine contraceptive device (IUD)   • IUD check up   • Class 1 drug-induced obesity without serious comorbidity with body mass index (BMI) of 34.0 to 34.9 in adult      Past Medical History:   Diagnosis Date   • Anemia     hx of   • Hypothyroidism affecting pregnancy 2017   • Ovarian cyst     age 13   • Status post vacuum-assisted vaginal delivery 2017   • Thyroid cancer ([address])     thyroid CA- throidectomy 2017   • Urinary tract infection     hx of uti's   • Varicella     childhood chickenpox      Past Surgical History:   Procedure Laterality Date   • THYROIDECTOMY     • VAGINAL DELIVERY     • WISDOM TOOTH EXTRACTION        Family History   Problem Relation Age of Onset   • Cancer Mother    • Stroke Father    • Heart disease Father         defect   • Cancer Paternal Grandfather         throat     Social History     Tobacco Use   • Smoking status: Former     Types: Cigarettes     Quit date: 2016     Years since quittin.6   • Smokeless tobacco: Never   • Tobacco comments:     quit    Substance Use Topics   • Alcohol use: No     Allergies   Allergen Reactions   • Aspirin Anaphylaxis and Angioedema       Current Outpatient Medications:   •  levonorgestrel (MIRENA) 20 MCG/24HR IUD, 1 each by Intrauterine route once, Disp: , Rfl:   •  Synthroid 175 MCG tablet, TAKE 1 TABLET DAILY, Disp: 90 tablet, Rfl: 0  •  Wegovy 1.7 MG/0.75ML, INJECT THE CONTENTS OF 1 SYRINGE BELOW THE SKIN ONCE A WEEK, Disp: , Rfl:     Review of Systems   Constitutional: Negative for chills, diaphoresis, fatigue and unexpected weight change. HENT: Negative for trouble swallowing and voice change. Eyes: Negative for photophobia and visual disturbance. Gastrointestinal: Negative for constipation and diarrhea. Endocrine: Negative for cold intolerance and heat intolerance. Musculoskeletal: Negative for arthralgias, joint swelling and myalgias. Skin: Negative. Physical Exam:  Body mass index is 31.38 kg/m². /70   Pulse 86   Ht 5' 6.5" (1.689 m)   Wt 89.5 kg (197 lb 6.4 oz)   SpO2 99%   BMI 31.38 kg/m²    Wt Readings from Last 3 Encounters:   09/06/23 89.5 kg (197 lb 6.4 oz)   08/16/23 90.2 kg (198 lb 12.8 oz)   06/05/23 96.2 kg (212 lb)       Physical Exam  Constitutional:       Appearance: Normal appearance. She is obese. Cardiovascular:      Rate and Rhythm: Normal rate and regular rhythm. Pulses: Normal pulses. Pulmonary:      Effort: Pulmonary effort is normal.   Abdominal:      General: Abdomen is flat. Bowel sounds are normal.      Palpations: Abdomen is soft. Skin:     General: Skin is warm and dry. Capillary Refill: Capillary refill takes less than 2 seconds. Neurological:      General: No focal deficit present. Mental Status: She is alert and oriented to person, place, and time.    Psychiatric:         Mood and Affect: Mood normal.         Labs:    Latest Reference Range & Units 12/28/18 13:00 11/09/22 09:17 11/14/22 09:24 01/10/23 09:15 02/22/23 09:07 04/28/23 09:24   TSH, POC 0.450 - 4.500 uIU/mL  22.200 (H) 11.800 (H) 10.900 (H) 2.100 1.630   THYROGLOBULIN AB 0.0 - 0.9 IU/mL   <1.0      TSH Baseline uIU/mL 0.781        TSH 30 Minutes uIU/ML 0.800        (H): Data is abnormally high    Impression & Plan:    Problem List Items Addressed This Visit        Endocrine    Hypothyroidism, postsurgical - Primary    Relevant Orders    T4, free    TSH, 3rd generation       Other    Class 1 drug-induced obesity without serious comorbidity with body mass index (BMI) of 34.0 to 34.9 in adult       Orders Placed This Encounter   Procedures   • T4, free     Standing Status:   Future     Standing Expiration Date:   9/6/2024   • TSH, 3rd generation     This is a patient instruction: This test is non-fasting. Please drink two glasses of water morning of bloodwork. Standing Status:   Future     Standing Expiration Date:   9/6/2024       There are no Patient Instructions on file for this visit. 1. Hypothyroidism, likely due to post surgical from total thyroidectomy in 2017 for PTC [id number], Given low risk for thyroid cancer based on pathology goal TSH 0.2-2.0 range. Last Tg 11/22 was neg, Given low risk tumor s/p resection >5 years ago, will only check US if Tg marker positive- repeat annually. Repeat TG tumor marker 11/23. Most recent TSH 08/23 slightly low with free t4 slightly on upper limit normal. Discussed can have lower requirements now d/t weight loss. However given asymptomatic will repeat labs in 4 weeks and only if TSH low/suppressed reduce dose of synthroid. C/w 175mcg synthroid for now. Repeat TSH in 1 months now. 2. Obesity Class 1 w/o serious comorbid conditions. Currently weight 196lbs, good weight loss on ozempic, switch to [employer]. Gets this prescribed through PCP. C/w wegovy 1.7mg weekly for now, c/w diet, exercise and also follow up with weight loss program.     F/u in 3 months    Discussed with the patient and all questioned fully answered. She will call me if any problems arise.     Counseled patient on diagnostic results, prognosis, risk and benefit of treatment options, instruction for management, importance of treatment compliance, Risk  factor reduction and impressions      Norma So MD

## 2023-10-24 LAB
T4 FREE SERPL-MCNC: 1.68 NG/DL (ref 0.82–1.77)
THYROGLOB AB SERPL-ACNC: <1 IU/ML
THYROGLOB SERPL-MCNC: <0.1 NG/ML
THYROGLOB SERPL-MCNC: NORMAL NG/ML
TSH SERPL DL<=0.005 MIU/L-ACNC: 0.67 UIU/ML (ref 0.45–4.5)

## 2023-11-10 DIAGNOSIS — E89.0 HYPOTHYROIDISM, POSTSURGICAL: ICD-10-CM

## 2023-11-13 RX ORDER — LEVOTHYROXINE SODIUM 175 MCG
175 TABLET ORAL DAILY
Qty: 90 TABLET | Refills: 0 | Status: SHIPPED | OUTPATIENT
Start: 2023-11-13

## 2024-01-11 ENCOUNTER — OFFICE VISIT (OUTPATIENT)
Dept: ENDOCRINOLOGY | Facility: HOSPITAL | Age: 34
End: 2024-01-11
Payer: COMMERCIAL

## 2024-01-11 VITALS
BODY MASS INDEX: 29.03 KG/M2 | HEIGHT: 67 IN | OXYGEN SATURATION: 98 % | SYSTOLIC BLOOD PRESSURE: 120 MMHG | HEART RATE: 68 BPM | WEIGHT: 185 LBS | DIASTOLIC BLOOD PRESSURE: 70 MMHG

## 2024-01-11 DIAGNOSIS — E66.1 CLASS 1 DRUG-INDUCED OBESITY WITHOUT SERIOUS COMORBIDITY WITH BODY MASS INDEX (BMI) OF 34.0 TO 34.9 IN ADULT: ICD-10-CM

## 2024-01-11 DIAGNOSIS — E89.0 HYPOTHYROIDISM, POSTSURGICAL: Primary | ICD-10-CM

## 2024-01-11 PROCEDURE — 99214 OFFICE O/P EST MOD 30 MIN: CPT | Performed by: STUDENT IN AN ORGANIZED HEALTH CARE EDUCATION/TRAINING PROGRAM

## 2024-01-11 NOTE — PROGRESS NOTES
Established Patient Progress Note       Chief Complaint   Patient presents with    Obesity    Hypothyroidism      History of Present Illness:     Beulah Hayden is a 33 y.o. female with a history of post surgical hypothyroidism for benign thyroid nodules with prior history of hashimoto, Class 1 obesity who is seen today for Obesity and thyroid management.     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 Surgery in [place]- no records on file. History of labile thyroid control while on levothyroxine, therefore switched to synthroid. Most recent TSH 10/23  0.672 with free t4 1.68 on synthroid 175mcg daily. Currently feels good. Does report some dry skin and hair loss but otherwise denies any issues with fatigue, weight loss, dysphagia, odynophagia, tremors, palpitations     Obesity management:-   Weight Hx:- Started gaining weight since birth of her children. Lowest weight 188lbs when on high dose thyroid replacement. Highest weight was 235lbs  Weight on initial visit- 11/22- 216lbs. (was 226lbs before starting ozempic)  02/23- 215lbs   05/23- 212lbs  09/23- 197lbs   Initially on ozempic and later d/c by insurance d/t not being diabetic, later started on wegovy given by her PCP since initially insurance denied when done through out office. Currently on wegovy 1.7mg weekly Current weight 185lbs  Reports did lose down until 181lbs but then holidays had a lot of holiday meals and also d/t shortage of wegovy currently doing 1.7mg every other week.     Follows weight loss program through [facility] 08/23  Diet-  currently doing calorie restriction diet and also counting carbs.   Exercise- does mix of weight and cardio  Medication- Ozempic 0.25mg started on 01/23 for weight loss, 02/23 215lbs, was 226lbs before starting ozempic. ozempic 0.5mg weekly 06/23 212lbs. Stopped 08/23, switched to wegovy 1.7mg weekly 09/23-  currently doing every other week  Surgery- not interested    Patient Active Problem List   Diagnosis    Hypothyroidism, postsurgical    Encounter for insertion of ParaGard IUD    Intrauterine device surveillance    Encounter for initial prescription of intrauterine contraceptive device (IUD)    IUD check up    Class 1 drug-induced obesity without serious comorbidity with body mass index (BMI) of 34.0 to 34.9 in adult      Past Medical History:   Diagnosis Date    Anemia     hx of    Hypothyroidism affecting pregnancy 2017    Ovarian cyst     age 17    Status post vacuum-assisted vaginal delivery 2017    Thyroid cancer (HCC)     thyroid CA- throidectomy 2017    Urinary tract infection     hx of uti's    Varicella     childhood chickenpox      Past Surgical History:   Procedure Laterality Date    THYROIDECTOMY  2017    VAGINAL DELIVERY  2017    WISDOM TOOTH EXTRACTION        Family History   Problem Relation Age of Onset    Cancer Mother     Stroke Father     Heart disease Father         defect    Cancer Paternal Grandfather         throat     Social History     Tobacco Use    Smoking status: Former     Current packs/day: 0.00     Types: Cigarettes     Quit date: 2016     Years since quittin.0    Smokeless tobacco: Never    Tobacco comments:     quit    Substance Use Topics    Alcohol use: No     Allergies   Allergen Reactions    Aspirin Anaphylaxis and Angioedema       Current Outpatient Medications:     levonorgestrel (MIRENA) 20 MCG/24HR IUD, 1 each by Intrauterine route once, Disp: , Rfl:     Synthroid 175 MCG tablet, TAKE 1 TABLET DAILY, Disp: 90 tablet, Rfl: 0    Wegovy 1.7 MG/0.75ML, INJECT THE CONTENTS OF 1 SYRINGE BELOW THE SKIN ONCE A WEEK, Disp: , Rfl:     Review of Systems   Constitutional:  Negative for chills, diaphoresis, fatigue and unexpected weight change.   HENT:  Negative for trouble swallowing and voice change.    Eyes:  Negative for photophobia and visual disturbance.  "  Gastrointestinal:  Negative for constipation and diarrhea.   Endocrine: Negative for cold intolerance and heat intolerance.   Musculoskeletal:  Negative for arthralgias, joint swelling and myalgias.   Skin: Negative.        Physical Exam:  Body mass index is 29.41 kg/m².  /70   Pulse 68   Ht 5' 6.5\" (1.689 m)   Wt 83.9 kg (185 lb)   SpO2 98%   BMI 29.41 kg/m²    Wt Readings from Last 3 Encounters:   01/11/24 83.9 kg (185 lb)   09/06/23 89.5 kg (197 lb 6.4 oz)   08/16/23 90.2 kg (198 lb 12.8 oz)       Physical Exam  Constitutional:       Appearance: Normal appearance. She is obese.   Cardiovascular:      Rate and Rhythm: Normal rate and regular rhythm.      Pulses: Normal pulses.   Pulmonary:      Effort: Pulmonary effort is normal.   Abdominal:      General: Abdomen is flat. Bowel sounds are normal.      Palpations: Abdomen is soft.   Skin:     General: Skin is warm and dry.      Capillary Refill: Capillary refill takes less than 2 seconds.   Neurological:      General: No focal deficit present.      Mental Status: She is alert and oriented to person, place, and time.   Psychiatric:         Mood and Affect: Mood normal.         Labs:    Latest Reference Range & Units 02/22/23 09:07 04/28/23 09:24 10/19/23 09:30   TSH, POC 0.450 - 4.500 uIU/mL 2.100 1.630 0.672   Free T4 0.82 - 1.77 ng/dL   1.68   THYROGLOBULIN ng/mL   <0.1   THYROGLOBULIN (TG-RAGINI) ng/mL   Comment   THYROGLOBULIN AB IU/mL   <1.0       Impression & Plan:    Problem List Items Addressed This Visit          Endocrine    Hypothyroidism, postsurgical - Primary    Relevant Orders    T4, free    TSH, 3rd generation       Other    Class 1 drug-induced obesity without serious comorbidity with body mass index (BMI) of 34.0 to 34.9 in adult    Relevant Orders    T4, free    TSH, 3rd generation         Orders Placed This Encounter   Procedures    T4, free     Standing Status:   Standing     Number of Occurrences:   4     Standing Expiration Date:   " 1/11/2025    TSH, 3rd generation     This is a patient instruction: This test is non-fasting. Please drink two glasses of water morning of bloodwork.        Standing Status:   Standing     Number of Occurrences:   4     Standing Expiration Date:   1/11/2025       There are no Patient Instructions on file for this visit.    1. Hypothyroidism, likely due to post surgical from total thyroidectomy in 2017 for PTC [id number], Given low risk for thyroid cancer based on pathology goal TSH 0.2-2.0 range. Last Tg 11/22 was neg, Given low risk tumor s/p resection >5 years ago, will only check US if Tg marker positive- repeat annually. Tg tumor marker neg, repeat 1 year.  Most recent TSH 10/23 although low normal is still normal and since Free t4 ok and clinically feels well. C/w 175mcg synthroid for now. Repeat TSH in 3 months now.     2. Obesity Class 1 w/o serious comorbid conditions. Currently weight, good weight loss on ozempic, switch to wegovy d/t insurance. Gets this prescribed through PCP. C/w wegovy 1.7mg weekly for now, c/w diet, exercise and also follow up with weight loss program.   Discussed if having issues getting wegovy can consider zepbound but will need to start on lowest dose and make way up and also unsure coverage. She will let me know if interested    F/u in 6 months    Discussed with the patient and all questioned fully answered. She will call me if any problems arise.    Counseled patient on diagnostic results, prognosis, risk and benefit of treatment options, instruction for management, importance of treatment compliance, Risk  factor reduction and impressions      Liya Madrid MD

## 2024-02-22 ENCOUNTER — HOSPITAL ENCOUNTER (EMERGENCY)
Facility: HOSPITAL | Age: 34
Discharge: HOME | End: 2024-02-22
Attending: EMERGENCY MEDICINE
Payer: COMMERCIAL

## 2024-02-22 ENCOUNTER — APPOINTMENT (EMERGENCY)
Dept: CT IMAGING | Facility: HOSPITAL | Age: 34
End: 2024-02-22
Payer: COMMERCIAL

## 2024-02-22 ENCOUNTER — APPOINTMENT (EMERGENCY)
Dept: RADIOLOGY | Facility: HOSPITAL | Age: 34
End: 2024-02-22
Payer: COMMERCIAL

## 2024-02-22 VITALS
OXYGEN SATURATION: 100 % | SYSTOLIC BLOOD PRESSURE: 150 MMHG | HEART RATE: 86 BPM | RESPIRATION RATE: 14 BRPM | DIASTOLIC BLOOD PRESSURE: 81 MMHG | TEMPERATURE: 97.4 F

## 2024-02-22 DIAGNOSIS — M54.9 MUSCULOSKELETAL BACK PAIN: Primary | ICD-10-CM

## 2024-02-22 DIAGNOSIS — M54.2 NECK PAIN: ICD-10-CM

## 2024-02-22 LAB
ALBUMIN SERPL BCP-MCNC: 4.5 G/DL (ref 3.5–5)
ALP SERPL-CCNC: 35 U/L (ref 34–104)
ALT SERPL W P-5'-P-CCNC: 9 U/L (ref 7–52)
ANION GAP SERPL CALCULATED.3IONS-SCNC: 6 MMOL/L
AST SERPL W P-5'-P-CCNC: 10 U/L (ref 13–39)
ATRIAL RATE: 74 BPM
BASOPHILS # BLD AUTO: 0.04 THOUSANDS/ÂΜL (ref 0–0.1)
BASOPHILS NFR BLD AUTO: 1 % (ref 0–1)
BILIRUB SERPL-MCNC: 0.52 MG/DL (ref 0.2–1)
BUN SERPL-MCNC: 17 MG/DL (ref 5–25)
CALCIUM SERPL-MCNC: 9 MG/DL (ref 8.4–10.2)
CARDIAC TROPONIN I PNL SERPL HS: <2 NG/L
CHLORIDE SERPL-SCNC: 105 MMOL/L (ref 96–108)
CO2 SERPL-SCNC: 28 MMOL/L (ref 21–32)
CREAT SERPL-MCNC: 0.76 MG/DL (ref 0.6–1.3)
EOSINOPHIL # BLD AUTO: 0.09 THOUSAND/ÂΜL (ref 0–0.61)
EOSINOPHIL NFR BLD AUTO: 1 % (ref 0–6)
ERYTHROCYTE [DISTWIDTH] IN BLOOD BY AUTOMATED COUNT: 13.2 % (ref 11.6–15.1)
GFR SERPL CREATININE-BSD FRML MDRD: 104 ML/MIN/1.73SQ M
GLUCOSE SERPL-MCNC: 83 MG/DL (ref 65–140)
HCT VFR BLD AUTO: 36.6 % (ref 34.8–46.1)
HGB BLD-MCNC: 12 G/DL (ref 11.5–15.4)
IMM GRANULOCYTES # BLD AUTO: 0.02 THOUSAND/UL (ref 0–0.2)
IMM GRANULOCYTES NFR BLD AUTO: 0 % (ref 0–2)
LYMPHOCYTES # BLD AUTO: 2.35 THOUSANDS/ÂΜL (ref 0.6–4.47)
LYMPHOCYTES NFR BLD AUTO: 30 % (ref 14–44)
MCH RBC QN AUTO: 28.6 PG (ref 26.8–34.3)
MCHC RBC AUTO-ENTMCNC: 32.8 G/DL (ref 31.4–37.4)
MCV RBC AUTO: 87 FL (ref 82–98)
MONOCYTES # BLD AUTO: 0.4 THOUSAND/ÂΜL (ref 0.17–1.22)
MONOCYTES NFR BLD AUTO: 5 % (ref 4–12)
NEUTROPHILS # BLD AUTO: 4.87 THOUSANDS/ÂΜL (ref 1.85–7.62)
NEUTS SEG NFR BLD AUTO: 63 % (ref 43–75)
NRBC BLD AUTO-RTO: 0 /100 WBCS
P AXIS: 69 DEGREES
PLATELET # BLD AUTO: 310 THOUSANDS/UL (ref 149–390)
PMV BLD AUTO: 8.7 FL (ref 8.9–12.7)
POTASSIUM SERPL-SCNC: 3.7 MMOL/L (ref 3.5–5.3)
PR INTERVAL: 200 MS
PROT SERPL-MCNC: 7.2 G/DL (ref 6.4–8.4)
QRS AXIS: 59 DEGREES
QRSD INTERVAL: 116 MS
QT INTERVAL: 416 MS
QTC INTERVAL: 461 MS
RBC # BLD AUTO: 4.2 MILLION/UL (ref 3.81–5.12)
SODIUM SERPL-SCNC: 139 MMOL/L (ref 135–147)
T WAVE AXIS: 93 DEGREES
VENTRICULAR RATE: 74 BPM
WBC # BLD AUTO: 7.77 THOUSAND/UL (ref 4.31–10.16)

## 2024-02-22 PROCEDURE — 99284 EMERGENCY DEPT VISIT MOD MDM: CPT

## 2024-02-22 PROCEDURE — 96365 THER/PROPH/DIAG IV INF INIT: CPT

## 2024-02-22 PROCEDURE — 80053 COMPREHEN METABOLIC PANEL: CPT

## 2024-02-22 PROCEDURE — 84484 ASSAY OF TROPONIN QUANT: CPT

## 2024-02-22 PROCEDURE — 72125 CT NECK SPINE W/O DYE: CPT

## 2024-02-22 PROCEDURE — 71046 X-RAY EXAM CHEST 2 VIEWS: CPT

## 2024-02-22 PROCEDURE — 96375 TX/PRO/DX INJ NEW DRUG ADDON: CPT

## 2024-02-22 PROCEDURE — 93005 ELECTROCARDIOGRAM TRACING: CPT

## 2024-02-22 PROCEDURE — 70450 CT HEAD/BRAIN W/O DYE: CPT

## 2024-02-22 PROCEDURE — 96361 HYDRATE IV INFUSION ADD-ON: CPT

## 2024-02-22 PROCEDURE — 36415 COLL VENOUS BLD VENIPUNCTURE: CPT

## 2024-02-22 PROCEDURE — 85025 COMPLETE CBC W/AUTO DIFF WBC: CPT

## 2024-02-22 PROCEDURE — 93010 ELECTROCARDIOGRAM REPORT: CPT | Performed by: INTERNAL MEDICINE

## 2024-02-22 RX ORDER — DEXAMETHASONE SODIUM PHOSPHATE 10 MG/ML
10 INJECTION, SOLUTION INTRAMUSCULAR; INTRAVENOUS ONCE
Status: COMPLETED | OUTPATIENT
Start: 2024-02-22 | End: 2024-02-22

## 2024-02-22 RX ORDER — METHOCARBAMOL 500 MG/1
500 TABLET, FILM COATED ORAL 2 TIMES DAILY
Qty: 10 TABLET | Refills: 0 | Status: SHIPPED | OUTPATIENT
Start: 2024-02-22

## 2024-02-22 RX ORDER — LIDOCAINE 50 MG/G
1 PATCH TOPICAL ONCE
Status: DISCONTINUED | OUTPATIENT
Start: 2024-02-22 | End: 2024-02-22 | Stop reason: HOSPADM

## 2024-02-22 RX ORDER — LIDOCAINE 50 MG/G
1 PATCH TOPICAL DAILY
Qty: 5 PATCH | Refills: 0 | Status: SHIPPED | OUTPATIENT
Start: 2024-02-22

## 2024-02-22 RX ORDER — ACETAMINOPHEN 10 MG/ML
1000 INJECTION, SOLUTION INTRAVENOUS ONCE
Status: DISCONTINUED | OUTPATIENT
Start: 2024-02-22 | End: 2024-02-22

## 2024-02-22 RX ORDER — ACETAMINOPHEN 10 MG/ML
1000 INJECTION, SOLUTION INTRAVENOUS ONCE
Status: COMPLETED | OUTPATIENT
Start: 2024-02-22 | End: 2024-02-22

## 2024-02-22 RX ADMIN — SODIUM CHLORIDE 1000 ML: 0.9 INJECTION, SOLUTION INTRAVENOUS at 16:04

## 2024-02-22 RX ADMIN — LIDOCAINE 5% 1 PATCH: 700 PATCH TOPICAL at 17:18

## 2024-02-22 RX ADMIN — ACETAMINOPHEN 1000 MG: 10 INJECTION INTRAVENOUS at 17:18

## 2024-02-22 RX ADMIN — DEXAMETHASONE SODIUM PHOSPHATE 10 MG: 10 INJECTION, SOLUTION INTRAMUSCULAR; INTRAVENOUS at 16:05

## 2024-02-22 NOTE — DISCHARGE INSTRUCTIONS
Follow up with PCP  Supportive care as discussed  Return to the ED with new or worsening symptoms including but not limited to worsening pain, visual changes, headache

## 2024-02-23 NOTE — ED PROVIDER NOTES
History  Chief Complaint   Patient presents with    Back Pain     Patient reports lifting a heavy box last week and hurting their left lower back, patient reports pain resolved but now reports new onset of middle back pain going up their neck into their head making it painful to turn their neck from side to side, and that pain radiates through to their chest all starting two days ago.      Patient is a 33-year-old female with a past medical history of anemia, hypothyroidism after thyroidectomy presenting to the emergency department for evaluation of neck pain.  Patient states she has neck pain radiating into the base of her head and into her chest for the past 2 days.  In contrast with triage note patient denies any recent heavy lifting, twisting, turning or injury.  Patient states she has not taken anything at home since the first day.  Patient states she feels that her neck is very stiff and difficult to move secondary to the tight muscle spasms.  Patient denies any fevers, visual changes, difficulties with balance or back pain.  Patient reports moving her head makes the pain worse.  Patient offers no other concerns or complaints at this time.        Prior to Admission Medications   Prescriptions Last Dose Informant Patient Reported? Taking?   Synthroid 175 MCG tablet   No No   Sig: TAKE 1 TABLET DAILY   Wegovy 1.7 MG/0.75ML   Yes No   Sig: INJECT THE CONTENTS OF 1 SYRINGE BELOW THE SKIN ONCE A WEEK   levonorgestrel (MIRENA) 20 MCG/24HR IUD  Self Yes No   Si each by Intrauterine route once      Facility-Administered Medications: None       Past Medical History:   Diagnosis Date    Anemia     hx of    Hypothyroidism affecting pregnancy 2017    Ovarian cyst     age 17    Status post vacuum-assisted vaginal delivery 2017    Thyroid cancer (HCC)     thyroid CA- throidectomy 2017    Urinary tract infection     hx of uti's    Varicella     childhood chickenpox       Past Surgical History:   Procedure  Laterality Date    THYROIDECTOMY  2017    VAGINAL DELIVERY  2017    WISDOM TOOTH EXTRACTION         Family History   Problem Relation Age of Onset    Cancer Mother     Stroke Father     Heart disease Father         defect    Cancer Paternal Grandfather         throat     I have reviewed and agree with the history as documented.    E-Cigarette/Vaping    E-Cigarette Use Never User      E-Cigarette/Vaping Substances    Nicotine No     THC No     CBD No     Flavoring No     Other No     Unknown No      Social History     Tobacco Use    Smoking status: Former     Current packs/day: 0.00     Types: Cigarettes     Quit date: 2016     Years since quittin.1    Smokeless tobacco: Never    Tobacco comments:     quit 2016   Vaping Use    Vaping status: Never Used   Substance Use Topics    Alcohol use: No    Drug use: Never     Types: Marijuana     Comment: last  used 10 yrs ago.       Review of Systems   Constitutional:  Negative for chills and fever.   HENT:  Negative for ear pain and sore throat.    Eyes:  Negative for pain and visual disturbance.   Respiratory:  Negative for cough and shortness of breath.    Cardiovascular:  Negative for chest pain and palpitations.   Gastrointestinal:  Negative for abdominal pain and vomiting.   Genitourinary:  Negative for dysuria and hematuria.   Musculoskeletal:  Positive for neck pain and neck stiffness. Negative for arthralgias and back pain.   Skin:  Negative for color change and rash.   Neurological:  Positive for headaches. Negative for seizures and syncope.   All other systems reviewed and are negative.      Physical Exam  Physical Exam  Vitals and nursing note reviewed.   Constitutional:       General: She is not in acute distress.     Appearance: Normal appearance. She is not toxic-appearing or diaphoretic.   HENT:      Head: Normocephalic and atraumatic.      Right Ear: External ear normal.      Left Ear: External ear normal.      Nose: Nose normal.      Mouth/Throat:       Mouth: Mucous membranes are moist.   Eyes:      General: No scleral icterus.        Right eye: No discharge.         Left eye: No discharge.      Conjunctiva/sclera: Conjunctivae normal.   Neck:      Meningeal: Brudzinski's sign and Kernig's sign absent.      Comments: Patient has full flexion and extension of the neck without pain or restriction.  Patient does have pain with rotation of the neck but has full range of motion.  Patient has pain of the paraspinals.  No midline tenderness, step-offs or deformities.  No meningeal signs  Pulmonary:      Effort: Pulmonary effort is normal. No respiratory distress.   Musculoskeletal:         General: No swelling, deformity or signs of injury. Normal range of motion.      Cervical back: Normal range of motion and neck supple. No rigidity.   Skin:     General: Skin is warm and dry.      Coloration: Skin is not jaundiced.      Findings: No erythema or rash.   Neurological:      General: No focal deficit present.      Mental Status: She is alert and oriented to person, place, and time. Mental status is at baseline.      Cranial Nerves: No cranial nerve deficit.      Gait: Gait normal.   Psychiatric:         Mood and Affect: Mood normal.         Behavior: Behavior normal.         Thought Content: Thought content normal.         Judgment: Judgment normal.         Vital Signs  ED Triage Vitals [02/22/24 1505]   Temperature Pulse Respirations Blood Pressure SpO2   (!) 97.4 °F (36.3 °C) 86 14 150/81 100 %      Temp Source Heart Rate Source Patient Position - Orthostatic VS BP Location FiO2 (%)   Temporal Monitor Sitting Left arm --      Pain Score       --           Vitals:    02/22/24 1505   BP: 150/81   Pulse: 86   Patient Position - Orthostatic VS: Sitting         Visual Acuity      ED Medications  Medications   dexamethasone (PF) (DECADRON) injection 10 mg (10 mg Intravenous Given 2/22/24 1605)   sodium chloride 0.9 % bolus 1,000 mL (0 mL Intravenous Stopped 2/22/24 1700)    acetaminophen (Ofirmev) injection 1,000 mg (0 mg Intravenous Stopped 2/22/24 1744)       Diagnostic Studies  Results Reviewed       Procedure Component Value Units Date/Time    HS Troponin 0hr (reflex protocol) [[id number]]  (Normal) Collected: 02/22/24 1600    Lab Status: Final result Specimen: Blood from Arm, Right Updated: 02/22/24 1638     hs TnI 0hr <2 ng/L     Comprehensive metabolic panel [[id number]]  (Abnormal) Collected: 02/22/24 1600    Lab Status: Final result Specimen: Blood from Arm, Right Updated: 02/22/24 1633     Sodium 139 mmol/L      Potassium 3.7 mmol/L      Chloride 105 mmol/L      CO2 28 mmol/L      ANION GAP 6 mmol/L      BUN 17 mg/dL      Creatinine 0.76 mg/dL      Glucose 83 mg/dL      Calcium 9.0 mg/dL      AST 10 U/L      ALT 9 U/L      Alkaline Phosphatase 35 U/L      Total Protein 7.2 g/dL      Albumin 4.5 g/dL      Total Bilirubin 0.52 mg/dL      eGFR 104 ml/min/1.73sq m     Narrative:      National Kidney Disease Foundation guidelines for Chronic Kidney Disease (CKD):     Stage 1 with normal or high GFR (GFR > 90 mL/min/1.73 square meters)    Stage 2 Mild CKD (GFR = 60-89 mL/min/1.73 square meters)    Stage 3A Moderate CKD (GFR = 45-59 mL/min/1.73 square meters)    Stage 3B Moderate CKD (GFR = 30-44 mL/min/1.73 square meters)    Stage 4 Severe CKD (GFR = 15-29 mL/min/1.73 square meters)    Stage 5 End Stage CKD (GFR <15 mL/min/1.73 square meters)  Note: GFR calculation is accurate only with a steady state creatinine    CBC and differential [[id number]]  (Abnormal) Collected: 02/22/24 1600    Lab Status: Final result Specimen: Blood from Arm, Right Updated: 02/22/24 1610     WBC 7.77 Thousand/uL      RBC 4.20 Million/uL      Hemoglobin 12.0 g/dL      Hematocrit 36.6 %      MCV 87 fL      MCH 28.6 pg      MCHC 32.8 g/dL      RDW 13.2 %      MPV 8.7 fL      Platelets 310 Thousands/uL      nRBC 0 /100 WBCs      Neutrophils Relative 63 %      Immat GRANS % 0 %      Lymphocytes Relative 30 %       Monocytes Relative 5 %      Eosinophils Relative 1 %      Basophils Relative 1 %      Neutrophils Absolute 4.87 Thousands/µL      Immature Grans Absolute 0.02 Thousand/uL      Lymphocytes Absolute 2.35 Thousands/µL      Monocytes Absolute 0.40 Thousand/µL      Eosinophils Absolute 0.09 Thousand/µL      Basophils Absolute 0.04 Thousands/µL                    CT head without contrast   Final Result by Jose Hickman MD (02/22 1617)      No acute intracranial abnormality.                  Workstation performed: [id number]         CT cervical spine without contrast   Final Result by Jose Hickman MD (02/22 1618)      No cervical spine fracture or traumatic malalignment.                  Workstation performed: [id number]         XR chest 2 views    (Results Pending)              Procedures  Procedures         ED Course               HEART Risk Score      Flowsheet Row Most Recent Value   Heart Score Risk Calculator    History 0 Filed at: 02/22/2024 2207   ECG 1 Filed at: 02/22/2024 2207   Age 0 Filed at: 02/22/2024 2207   Risk Factors 0 Filed at: 02/22/2024 2207   Troponin 0 Filed at: 02/22/2024 2207   HEART Score 1 Filed at: 02/22/2024 2207                  Medical Decision Making    This is a 33-year-old female present to the emergency department for evaluation of neck pain.  Patient states she began having neck pain 2 days ago.  Patient states the pain has worsened and her neck has become stiff causing pain in her back of her head and chest.  Patient reports she did not take anything for pain today.  Patient denies any dizziness, weakness, visual changes.  Patient is in no acute distress with stable vital signs on initial examination.    Differential diagnosis to include but is not limited to: Musculoskeletal strain, sprain, disc etiology, ACS, STEMI, arrhythmia    Initial ED Plan: imaging, labs    ED results:  Patient reports slight improvement after medication.  No acute intracranial normality, no cervical  spine fracture or traumatic malalignment  0 hour troponin: <2  Heart score: 1    Final ED assessment: Patient is stable and well appearing. Discussed radiologic studies and laboratory results.  Discussed follow-up with PCP.  Discussed supportive care.  Strict return precautions were discussed including but not limited to worsening pain, weakness, dizziness, visual changes.  Patient verbalized understanding and is agreeable with the plan for discharge.     Amount and/or Complexity of Data Reviewed  Labs: ordered.  Radiology: ordered.    Risk  Prescription drug management.             Disposition  Final diagnoses:   Musculoskeletal back pain   Neck pain     Time reflects when diagnosis was documented in both MDM as applicable and the Disposition within this note       Time User Action Codes Description Comment    2/22/2024  5:42 PM Luisana Barfield [M54.9] Musculoskeletal back pain     2/22/2024  5:42 PM Luisana Barfield [M54.2] Neck pain           ED Disposition       ED Disposition   Discharge    Condition   Stable    Date/Time   Thu Feb 22, 2024 1742    Comment   Beulah Hayden discharge to home/self care.                   Follow-up Information       Follow up With Specialties Details Why Contact Info Additional Information    Papito Turner  Call in 3 days For follow up [address] [facility] [address]  [phone]       [facility] Emergency Department Emergency Medicine Go to  If symptoms worsen [address] [facility] [address] [phone] [facility] Emergency Department, [address], [place], [address]            Discharge Medication List as of 2/22/2024  5:44 PM        START taking these medications    Details   lidocaine (Lidoderm) 5 % Apply 1 patch topically over 12 hours daily Remove & Discard patch within 12 hours or as directed by MD, Starting Thu 2/22/2024, Normal      methocarbamol (ROBAXIN) 500 mg tablet Take 1  tablet (500 mg total) by mouth 2 (two) times a day, Starting Thu 2/22/2024, Normal           CONTINUE these medications which have NOT CHANGED    Details   levonorgestrel (MIRENA) 20 MCG/24HR IUD 1 each by Intrauterine route once, Historical Med      Synthroid 175 MCG tablet TAKE 1 TABLET DAILY, Starting Mon 11/13/2023, Normal      Wegovy 1.7 MG/0.75ML INJECT THE CONTENTS OF 1 SYRINGE BELOW THE SKIN ONCE A WEEK, Historical Med             No discharge procedures on file.    PDMP Review       None            ED Provider  Electronically Signed by             Luisana Barfield PA-C  02/22/24 8836

## 2024-05-16 DIAGNOSIS — E89.0 HYPOTHYROIDISM, POSTSURGICAL: ICD-10-CM

## 2024-05-17 RX ORDER — LEVOTHYROXINE SODIUM 175 MCG
175 TABLET ORAL DAILY
Qty: 90 TABLET | Refills: 1 | Status: SHIPPED | OUTPATIENT
Start: 2024-05-17

## 2024-08-06 ENCOUNTER — OFFICE VISIT (OUTPATIENT)
Dept: ENDOCRINOLOGY | Facility: HOSPITAL | Age: 34
End: 2024-08-06
Payer: COMMERCIAL

## 2024-08-06 VITALS
OXYGEN SATURATION: 98 % | HEART RATE: 81 BPM | SYSTOLIC BLOOD PRESSURE: 118 MMHG | BODY MASS INDEX: 27.88 KG/M2 | WEIGHT: 177.6 LBS | HEIGHT: 67 IN | DIASTOLIC BLOOD PRESSURE: 78 MMHG

## 2024-08-06 DIAGNOSIS — L65.9 HAIR LOSS: ICD-10-CM

## 2024-08-06 DIAGNOSIS — E66.1 CLASS 1 DRUG-INDUCED OBESITY WITHOUT SERIOUS COMORBIDITY WITH BODY MASS INDEX (BMI) OF 34.0 TO 34.9 IN ADULT: ICD-10-CM

## 2024-08-06 DIAGNOSIS — R23.2 HOT FLASHES: ICD-10-CM

## 2024-08-06 DIAGNOSIS — E89.0 HYPOTHYROIDISM, POSTSURGICAL: Primary | ICD-10-CM

## 2024-08-06 PROCEDURE — 99214 OFFICE O/P EST MOD 30 MIN: CPT | Performed by: STUDENT IN AN ORGANIZED HEALTH CARE EDUCATION/TRAINING PROGRAM

## 2024-08-06 RX ORDER — SEMAGLUTIDE 2.4 MG/.75ML
INJECTION, SOLUTION SUBCUTANEOUS
COMMUNITY
Start: 2024-07-25

## 2024-08-06 NOTE — PROGRESS NOTES
Established Patient Progress Note       Chief Complaint   Patient presents with    Hypothyroidism      History of Present Illness:     Beulah Hayden is a 33 y.o. female with a history of post surgical hypothyroidism for benign thyroid nodules with prior history of hashimoto, Class 1 obesity who is seen today for Obesity and thyroid management.     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 Surgery in [place]- no records on file. History of labile thyroid control while on levothyroxine, therefore switched to synthroid. Last labs through us TSH 10/23  0.672 with free t4 1.68 on synthroid 175mcg daily. However she did have labs through PCP few days ago which showed elevated TSH at 13.9 but normal T4 1.09 and hence here to review these. Reports cannot lose any further weight but is happy with her current weight loss. Does report fatigue, having on/off diarrhea/constipation, hot flashes, twitching arround her eyes. Also reports hair loss in clumps. Denies missing any of her medications.     Obesity management:-   Weight Hx:- Started gaining weight since birth of her children. Lowest weight 188lbs when on high dose thyroid replacement. Highest weight was 235lbs  Weight on initial visit- 11/22- 216lbs. (was 226lbs before starting ozempic)  02/23- 215lbs   05/23- 212lbs  09/23- 197lbs   08/24- 177lbs     Initially on ozempic and later d/c by insurance d/t not being diabetic, later started on wegovy given by her PCP since initially insurance denied when done through out office. Currently on wegovy 1.7mg weekly   [facility] 08/23  Surgery- not interested    Patient Active Problem List   Diagnosis    Hypothyroidism, postsurgical    Encounter for insertion of ParaGard IUD    Intrauterine device surveillance    Encounter for initial prescription of intrauterine contraceptive device (IUD)     IUD check up    Class 1 drug-induced obesity without serious comorbidity with body mass index (BMI) of 34.0 to 34.9 in adult      Past Medical History:   Diagnosis Date    Anemia     hx of    Hypothyroidism affecting pregnancy 2017    Ovarian cyst     age 17    Status post vacuum-assisted vaginal delivery 2017    Thyroid cancer (HCC)     thyroid CA- throidectomy 2017    Urinary tract infection     hx of uti's    Varicella     childhood chickenpox      Past Surgical History:   Procedure Laterality Date    THYROIDECTOMY  2017    VAGINAL DELIVERY  2017    WISDOM TOOTH EXTRACTION        Family History   Problem Relation Age of Onset    Cancer Mother     Stroke Father     Heart disease Father         defect    Cancer Paternal Grandfather         throat     Social History     Tobacco Use    Smoking status: Former     Current packs/day: 0.00     Types: Cigarettes     Quit date: 2016     Years since quittin.5    Smokeless tobacco: Never    Tobacco comments:     quit 2016   Substance Use Topics    Alcohol use: No     Allergies   Allergen Reactions    Aspirin Anaphylaxis and Angioedema       Current Outpatient Medications:     levonorgestrel (MIRENA) 20 MCG/24HR IUD, 1 each by Intrauterine route once, Disp: , Rfl:     levothyroxine (Synthroid) 175 mcg tablet, TAKE 1 TABLET DAILY, Disp: 90 tablet, Rfl: 1    Wegovy 2.4 MG/0.75ML, USE 1 INJECTION UNDER THE SKIN WEEKLY, Disp: , Rfl:     Review of Systems   Constitutional:  Negative for chills, diaphoresis, fatigue and unexpected weight change.   HENT:  Negative for trouble swallowing and voice change.    Eyes:  Negative for photophobia and visual disturbance.   Gastrointestinal:  Negative for constipation and diarrhea.   Endocrine: Negative for cold intolerance and heat intolerance.   Musculoskeletal:  Negative for arthralgias, joint swelling and myalgias.   Skin: Negative.        Physical Exam:  Body mass index is 28.24 kg/m².  /78   Pulse 81   Ht 5'  "6.5\" (1.689 m)   Wt 80.6 kg (177 lb 9.6 oz)   SpO2 98%   BMI 28.24 kg/m²    Wt Readings from Last 3 Encounters:   08/06/24 80.6 kg (177 lb 9.6 oz)   01/11/24 83.9 kg (185 lb)   09/06/23 89.5 kg (197 lb 6.4 oz)       Physical Exam  Constitutional:       Appearance: Normal appearance. She is obese.   Cardiovascular:      Rate and Rhythm: Normal rate and regular rhythm.      Pulses: Normal pulses.   Pulmonary:      Effort: Pulmonary effort is normal.   Abdominal:      General: Abdomen is flat. Bowel sounds are normal.      Palpations: Abdomen is soft.   Skin:     General: Skin is warm and dry.      Capillary Refill: Capillary refill takes less than 2 seconds.   Neurological:      General: No focal deficit present.      Mental Status: She is alert and oriented to person, place, and time.   Psychiatric:         Mood and Affect: Mood normal.         Labs:    Latest Reference Range & Units 11/09/22 09:17 11/14/22 09:24 01/10/23 09:15 02/22/23 09:07 04/28/23 09:24 10/19/23 09:30   TSH, POC 0.450 - 4.500 uIU/mL 22.200 (H) 11.800 (H) 10.900 (H) 2.100 1.630 0.672   FREE T4 0.82 - 1.77 ng/dL      1.68   THYROGLOBULIN ng/mL      <0.1   THYROGLOBULIN (TG-RAGINI) ng/mL      Comment   THYROGLOBULIN AB IU/mL  <1.0    <1.0   (H): Data is abnormally high      Impression & Plan:    Problem List Items Addressed This Visit          Endocrine    Hypothyroidism, postsurgical - Primary    Relevant Orders    T4, free    TSH, 3rd generation       Other    Class 1 drug-induced obesity without serious comorbidity with body mass index (BMI) of 34.0 to 34.9 in adult    Relevant Orders    T4, free    TSH, 3rd generation     Other Visit Diagnoses       Hot flashes        Relevant Orders    Metanephrine, Fractionated Plasma Free    T4, free    TSH, 3rd generation    Hair loss        Relevant Orders    Ambulatory Referral to Dermatology    Testosterone, free, total    T4, free    TSH, 3rd generation                Orders Placed This Encounter "   Procedures    Metanephrine, Fractionated Plasma Free     This is a Patient Instruction:This test requires patient fasting for 10-12 hours or longer. The patient should be laying down for at least 15 minutes before and during sample collection.      Standing Status:   Future     Standing Expiration Date:   8/6/2025    Testosterone, free, total     This is a patient instruction: Fasting preferred. Collections for men not undergoing treatment must be completed between 7am-9am ONLY. Collection time restrictions are not applicable to women or men already undergoing treatment.     Standing Status:   Future     Standing Expiration Date:   8/6/2025    T4, free     Standing Status:   Standing     Number of Occurrences:   2     Standing Expiration Date:   8/6/2025    TSH, 3rd generation     This is a patient instruction: This test is non-fasting. Please drink two glasses of water morning of bloodwork.        Standing Status:   Standing     Number of Occurrences:   2     Standing Expiration Date:   8/6/2025    Ambulatory Referral to Dermatology     Standing Status:   Future     Standing Expiration Date:   8/6/2025     Referral Priority:   Routine     Referral Type:   Consult - AMB     Referral Reason:   Specialty Services Required     Requested Specialty:   Dermatology     Number of Visits Requested:   1     Expiration Date:   8/6/2025       There are no Patient Instructions on file for this visit.    1. Hypothyroidism, likely due to post surgical from total thyroidectomy in 2017 for PTC [id number], Given low risk for thyroid cancer based on pathology goal TSH 0.2-2.0 range. Last Tg 11/22 was neg, Given low risk tumor s/p resection >5 years ago, will only check US if Tg marker positive- repeat annually, Tg tumor marker neg, repeat 1 year- 10/24 already ordered.  Most recent TSH per labs through PCP did show elevated TSH but normal T4. Patient clinically reports symptoms of both hyper and hypothyroidism.  Discussed at times labs  are not very accurate and hence would recommend repeating labs and now if co-relates we can make a slight adjustment to her dose to see if this helps. Currently on synthroid 175mcg daily. Check TFT now. Discussed her fatigue, hair loss could be fixed if thyroid issue but do not believe her eye twiching, hot flashes are thyroid related.     2. Obesity Class 1 w/o serious comorbid conditions. Currently weight, good weight loss on ozempic, switch to wegovy d/t insurance. Gets this prescribed through PCP. C/w wegovy 1.7mg weekly for now, c/w weight loss program.     3. Hot flashes- can check testosterone as welll d/t hair loss and hot flashes, will also check plasma meta. Discussed could also be a BG or dehydration issue. Can check BG PRN and if <55 reach out to me     4. Hair loss- We will work on making sure not a thyroid issue, vitD and iron checked and was normal. I will order testosterone and also refer to derm for management. Discussed ok to use rogaine/minoxidil and see if this helps OTC    F/u in 3 months for thyroid management     Discussed with the patient and all questioned fully answered. She will call me if any problems arise.    Counseled patient on diagnostic results, prognosis, risk and benefit of treatment options, instruction for management, importance of treatment compliance, Risk  factor reduction and impressions      Liya Madrid MD

## 2024-08-16 DIAGNOSIS — E89.0 HYPOTHYROIDISM, POSTSURGICAL: ICD-10-CM

## 2024-08-16 RX ORDER — LEVOTHYROXINE SODIUM 175 UG/1
175 TABLET ORAL DAILY
Qty: 90 TABLET | Refills: 0 | Status: SHIPPED | OUTPATIENT
Start: 2024-08-16

## 2024-10-30 LAB
METANEPH FREE SERPL-MCNC: 29.5 PG/ML (ref 0–88)
NORMETANEPHRINE SERPL-MCNC: 83.7 PG/ML (ref 0–210.1)
T4 FREE SERPL-MCNC: 1.39 NG/DL (ref 0.82–1.77)
TESTOST FREE SERPL-MCNC: 0.8 PG/ML (ref 0–4.2)
TESTOST SERPL-MCNC: 18 NG/DL (ref 8–60)
TSH SERPL DL<=0.005 MIU/L-ACNC: 10.1 UIU/ML (ref 0.45–4.5)

## 2024-11-10 DIAGNOSIS — E89.0 HYPOTHYROIDISM, POSTSURGICAL: ICD-10-CM

## 2024-11-11 RX ORDER — LEVOTHYROXINE SODIUM 175 MCG
175 TABLET ORAL DAILY
Qty: 90 TABLET | Refills: 1 | Status: SHIPPED | OUTPATIENT
Start: 2024-11-11

## 2025-08-16 DIAGNOSIS — E89.0 HYPOTHYROIDISM, POSTSURGICAL: ICD-10-CM

## 2025-08-18 ENCOUNTER — TELEPHONE (OUTPATIENT)
Age: 35
End: 2025-08-18

## 2025-08-18 DIAGNOSIS — E89.0 HYPOTHYROIDISM, POSTSURGICAL: Primary | ICD-10-CM

## 2025-08-18 RX ORDER — LEVOTHYROXINE SODIUM 175 UG/1
175 TABLET ORAL DAILY
Qty: 90 TABLET | Refills: 0 | Status: SHIPPED | OUTPATIENT
Start: 2025-08-18 | End: 2025-08-22 | Stop reason: SDUPTHER

## 2025-08-19 LAB
T4 FREE SERPL-MCNC: 1.13 NG/DL (ref 0.82–1.77)
TSH SERPL DL<=0.005 MIU/L-ACNC: 3.3 UIU/ML (ref 0.45–4.5)

## 2025-08-22 ENCOUNTER — OFFICE VISIT (OUTPATIENT)
Dept: ENDOCRINOLOGY | Facility: HOSPITAL | Age: 35
End: 2025-08-22
Payer: COMMERCIAL

## 2025-08-22 VITALS
OXYGEN SATURATION: 100 % | HEIGHT: 66 IN | WEIGHT: 184 LBS | SYSTOLIC BLOOD PRESSURE: 102 MMHG | HEART RATE: 98 BPM | BODY MASS INDEX: 29.57 KG/M2 | DIASTOLIC BLOOD PRESSURE: 68 MMHG

## 2025-08-22 DIAGNOSIS — E66.1 CLASS 1 DRUG-INDUCED OBESITY WITHOUT SERIOUS COMORBIDITY WITH BODY MASS INDEX (BMI) OF 34.0 TO 34.9 IN ADULT: ICD-10-CM

## 2025-08-22 DIAGNOSIS — E66.811 CLASS 1 DRUG-INDUCED OBESITY WITHOUT SERIOUS COMORBIDITY WITH BODY MASS INDEX (BMI) OF 34.0 TO 34.9 IN ADULT: ICD-10-CM

## 2025-08-22 DIAGNOSIS — E89.0 HYPOTHYROIDISM, POSTSURGICAL: Primary | ICD-10-CM

## 2025-08-22 PROCEDURE — 99214 OFFICE O/P EST MOD 30 MIN: CPT | Performed by: STUDENT IN AN ORGANIZED HEALTH CARE EDUCATION/TRAINING PROGRAM

## 2025-08-22 RX ORDER — LEVOTHYROXINE SODIUM 175 UG/1
175 TABLET ORAL DAILY
Qty: 90 TABLET | Refills: 0 | Status: SHIPPED | OUTPATIENT
Start: 2025-08-22